# Patient Record
Sex: FEMALE | Race: BLACK OR AFRICAN AMERICAN | Employment: FULL TIME | URBAN - METROPOLITAN AREA
[De-identification: names, ages, dates, MRNs, and addresses within clinical notes are randomized per-mention and may not be internally consistent; named-entity substitution may affect disease eponyms.]

---

## 2017-01-12 ENCOUNTER — TELEPHONE (OUTPATIENT)
Dept: OBGYN CLINIC | Facility: CLINIC | Age: 42
End: 2017-01-12

## 2017-01-12 ENCOUNTER — TELEPHONE (OUTPATIENT)
Dept: PULMONOLOGY | Facility: CLINIC | Age: 42
End: 2017-01-12

## 2017-01-12 NOTE — TELEPHONE ENCOUNTER
Patient Termination Request – Failure to Keep Account in Good Financial Standing    Collection Total: $467.01  Date You Last Saw Patient:10/27/16  Business Office Staff Comment: Patient has received 10  statements as well as a bad debt term warning letter. questions or concerns. Thank you.

## 2017-01-12 NOTE — TELEPHONE ENCOUNTER
Patient Termination Request – Failure to Keep Account in Good Financial Standing    Collection Total: $467.01  Date You Last Saw Patient:4/13/16  Business Office Staff Comment: Patient has received 10  statements as well as a bad debt term warning letter. questions or concerns. Thank you.

## 2017-01-12 NOTE — TELEPHONE ENCOUNTER
Patient Termination Request – Failure to Keep Account in Good Financial Standing    Collection Total: $467.01  Date You Last Saw Patient:8/4/16  Business Office Staff Comment: Patient has received 10  statements as well as a bad debt term warning letter.  Tiffany Adams questions or concerns. Thank you.

## 2017-02-23 ENCOUNTER — TELEPHONE (OUTPATIENT)
Dept: PULMONOLOGY | Facility: CLINIC | Age: 42
End: 2017-02-23

## 2017-02-23 NOTE — TELEPHONE ENCOUNTER
Spoke to Pt. Pt notified of below. Pt stts she will do whatever is needed to keep cpap machine. Pt stts she does not have Medicare. Pt aware that this office will give [facility] a call because rules are different with other insurances. Pt stts she has BCBS.  LPN

## 2017-02-23 NOTE — TELEPHONE ENCOUNTER
LMTCB. Per Anastacio/LUIS ALBERTO pt is non- compliant with her cpap machine with 1% usage. If pt wants to continue to use the cpap machine pt will need another PSG/Split night.

## 2017-02-24 NOTE — TELEPHONE ENCOUNTER
Pt notified of below. Pt stts she does not want to pay out of pocket. Pt stts she will follow up in clinic. Pt stts she is willing to go for a split night if her insurance will pay.  Appt scheduled for march 27th 2017 at 2:30 pm. Pt aware [facility] [address]

## 2017-02-24 NOTE — TELEPHONE ENCOUNTER
Followed up with Anastacio/LUIS ALBERTO. Due to the pt non-compliance Anastacio styann the pt has 2 options if she wants to continue to use the cpap machine. Pt can pay out of pocket for the machine or go for an a split night.

## 2017-03-27 ENCOUNTER — OFFICE VISIT (OUTPATIENT)
Dept: PULMONOLOGY | Facility: CLINIC | Age: 42
End: 2017-03-27

## 2017-03-27 VITALS
HEART RATE: 67 BPM | RESPIRATION RATE: 18 BRPM | SYSTOLIC BLOOD PRESSURE: 128 MMHG | OXYGEN SATURATION: 100 % | BODY MASS INDEX: 43.74 KG/M2 | WEIGHT: 256.19 LBS | HEIGHT: 64 IN | DIASTOLIC BLOOD PRESSURE: 83 MMHG

## 2017-03-27 DIAGNOSIS — G47.33 OSA (OBSTRUCTIVE SLEEP APNEA): Primary | ICD-10-CM

## 2017-03-27 PROCEDURE — 99213 OFFICE O/P EST LOW 20 MIN: CPT | Performed by: INTERNAL MEDICINE

## 2017-03-27 PROCEDURE — 99212 OFFICE O/P EST SF 10 MIN: CPT | Performed by: INTERNAL MEDICINE

## 2017-03-27 RX ORDER — LISINOPRIL 10 MG/1
TABLET ORAL
Refills: 3 | COMMUNITY
Start: 2017-03-01

## 2017-03-27 NOTE — PROGRESS NOTES
The patient is 51-year-old female who I know well from prior evaluation comes in now for follow-up. She has moderately severe obstructive sleep apnea with 25 respiratory events per hour.   She was set up with auto titrating CPAP but was intolerant and use

## 2017-04-21 ENCOUNTER — HOSPITAL ENCOUNTER (EMERGENCY)
Facility: HOSPITAL | Age: 42
Discharge: HOME | End: 2017-04-21
Attending: EMERGENCY MEDICINE
Payer: COMMERCIAL

## 2017-04-21 VITALS
HEIGHT: 64 IN | RESPIRATION RATE: 20 BRPM | OXYGEN SATURATION: 99 % | HEART RATE: 69 BPM | BODY MASS INDEX: 40.29 KG/M2 | SYSTOLIC BLOOD PRESSURE: 136 MMHG | WEIGHT: 236 LBS | TEMPERATURE: 99 F | DIASTOLIC BLOOD PRESSURE: 79 MMHG

## 2017-04-21 DIAGNOSIS — M54.50 BACK PAIN, LUMBOSACRAL: Primary | ICD-10-CM

## 2017-04-21 PROCEDURE — 81025 URINE PREGNANCY TEST: CPT

## 2017-04-21 PROCEDURE — 99283 EMERGENCY DEPT VISIT LOW MDM: CPT

## 2017-04-21 PROCEDURE — 87086 URINE CULTURE/COLONY COUNT: CPT | Performed by: EMERGENCY MEDICINE

## 2017-04-21 PROCEDURE — 81001 URINALYSIS AUTO W/SCOPE: CPT | Performed by: EMERGENCY MEDICINE

## 2017-04-21 RX ORDER — METHYLPREDNISOLONE 4 MG/1
TABLET ORAL
Qty: 1 PACKAGE | Refills: 0 | Status: SHIPPED | OUTPATIENT
Start: 2017-04-21 | End: 2017-07-24 | Stop reason: ALTCHOICE

## 2017-04-21 RX ORDER — IBUPROFEN 600 MG/1
600 TABLET ORAL ONCE
Status: COMPLETED | OUTPATIENT
Start: 2017-04-21 | End: 2017-04-21

## 2017-04-21 RX ORDER — HYDROCODONE BITARTRATE AND ACETAMINOPHEN 5; 325 MG/1; MG/1
1 TABLET ORAL EVERY 6 HOURS PRN
Qty: 12 TABLET | Refills: 0 | Status: SHIPPED | OUTPATIENT
Start: 2017-04-21 | End: 2017-11-25

## 2017-04-21 RX ORDER — CYCLOBENZAPRINE HCL 10 MG
10 TABLET ORAL 3 TIMES DAILY PRN
Qty: 20 TABLET | Refills: 0 | Status: SHIPPED | OUTPATIENT
Start: 2017-04-21 | End: 2017-04-28

## 2017-04-23 NOTE — ED PROVIDER NOTES
Patient Seen in: [facility] AND [facility] Emergency Department    History   Patient presents with:  Back Pain (musculoskeletal)      HPI    Patient presents complaining of right lower back pain for the past several days.   She states pain is sharp and severe, mu Skin: Negative for rash and wound. Neurological: Negative for syncope and headaches. Constitutional and vital signs reviewed. All other systems reviewed and negative except as noted above.     PSFH elements reviewed from today and agreed exce Diagnosis: Low back pain, muscle strain    ED Course: Patient presents with right lower back pain, reproducible on examination. No clinical concern for cauda equina or epidural abscess. No known trauma, patient appears well.   WBCs on urinalysis but posit

## 2017-07-24 ENCOUNTER — OFFICE VISIT (OUTPATIENT)
Dept: PULMONOLOGY | Facility: CLINIC | Age: 42
End: 2017-07-24

## 2017-07-24 VITALS
BODY MASS INDEX: 41.83 KG/M2 | HEIGHT: 64 IN | SYSTOLIC BLOOD PRESSURE: 146 MMHG | OXYGEN SATURATION: 100 % | DIASTOLIC BLOOD PRESSURE: 78 MMHG | HEART RATE: 87 BPM | WEIGHT: 245 LBS

## 2017-07-24 DIAGNOSIS — G47.33 OSA (OBSTRUCTIVE SLEEP APNEA): Primary | ICD-10-CM

## 2017-07-24 PROCEDURE — 99213 OFFICE O/P EST LOW 20 MIN: CPT | Performed by: INTERNAL MEDICINE

## 2017-07-24 PROCEDURE — 99212 OFFICE O/P EST SF 10 MIN: CPT | Performed by: INTERNAL MEDICINE

## 2017-07-24 NOTE — PROGRESS NOTES
The patient is 51-year-old female who I know well from prior evaluation who comes in now for follow-up. She has moderately severe obstructive sleep apnea and was intolerant of CPAP. She is using an oral appliance now.   She has very large tonsils that are

## 2017-08-01 ENCOUNTER — OFFICE VISIT (OUTPATIENT)
Dept: OTOLARYNGOLOGY | Facility: CLINIC | Age: 42
End: 2017-08-01

## 2017-08-01 VITALS
DIASTOLIC BLOOD PRESSURE: 70 MMHG | TEMPERATURE: 99 F | HEIGHT: 64 IN | SYSTOLIC BLOOD PRESSURE: 132 MMHG | BODY MASS INDEX: 41.83 KG/M2 | WEIGHT: 245 LBS

## 2017-08-01 DIAGNOSIS — G47.33 OSA (OBSTRUCTIVE SLEEP APNEA): Primary | ICD-10-CM

## 2017-08-01 PROCEDURE — 99212 OFFICE O/P EST SF 10 MIN: CPT | Performed by: OTOLARYNGOLOGY

## 2017-08-01 PROCEDURE — 99243 OFF/OP CNSLTJ NEW/EST LOW 30: CPT | Performed by: OTOLARYNGOLOGY

## 2017-08-01 NOTE — PROGRESS NOTES
Jonas Self is a 43year old female. Patient presents with: Tonsil Problem: patient presents for enlarged tonsils    HPI:   She had a sleep study performed at home which shows moderately severe obstructive sleep apnea.  She was not able to tolerate the C kg/m²   System Findings Details   Skin Normal Inspection - Normal.   Constitutional Normal Overall appearance - Normal.   Head/Face Normal Facial features - Normal. Eyebrows - Normal. Skull - Normal.   Oral/Oropharynx Normal Lips - Normal, Tonsils - neuro

## 2017-08-03 ENCOUNTER — TELEPHONE (OUTPATIENT)
Dept: PULMONOLOGY | Facility: CLINIC | Age: 42
End: 2017-08-03

## 2017-08-03 NOTE — TELEPHONE ENCOUNTER
Spoke to patient states was trying to reach Dr. Dana Jara. States will call back to speak to Dr. Dana Jara office.

## 2017-10-31 ENCOUNTER — OFFICE VISIT (OUTPATIENT)
Dept: SLEEP CENTER | Age: 42
End: 2017-10-31
Payer: COMMERCIAL

## 2017-10-31 DIAGNOSIS — Z76.89 SLEEP CONCERN: Primary | ICD-10-CM

## 2017-10-31 PROCEDURE — 95810 POLYSOM 6/> YRS 4/> PARAM: CPT

## 2017-11-02 NOTE — PROCEDURES
[address] [place] [address]  Accredited by the [employer] ([employer])    PATIENT'S NAME: Perez Cassandra   ATTENDING PHYSICIAN: Dory Ware MD   REFERRING PHYSICIAN:    PATIENT ACCOUNT #: [de-identified] LOCATION: [address] [place] [address] patient spent 58 minutes in the supine position, 70 minutes in the prone position, 212 minutes in the right side position.   The respiratory event related arousal index is 0.7 events per hour and the spontaneous arousal index is 2.3 events per hour, for a c

## 2017-11-25 ENCOUNTER — APPOINTMENT (OUTPATIENT)
Dept: GENERAL RADIOLOGY | Facility: HOSPITAL | Age: 42
End: 2017-11-25
Payer: COMMERCIAL

## 2017-11-25 ENCOUNTER — HOSPITAL ENCOUNTER (EMERGENCY)
Facility: HOSPITAL | Age: 42
Discharge: HOME | End: 2017-11-26
Attending: EMERGENCY MEDICINE
Payer: COMMERCIAL

## 2017-11-25 VITALS
DIASTOLIC BLOOD PRESSURE: 79 MMHG | BODY MASS INDEX: 40.12 KG/M2 | OXYGEN SATURATION: 97 % | HEIGHT: 64 IN | WEIGHT: 235 LBS | HEART RATE: 72 BPM | RESPIRATION RATE: 18 BRPM | TEMPERATURE: 99 F | SYSTOLIC BLOOD PRESSURE: 120 MMHG

## 2017-11-25 DIAGNOSIS — S46.912A SHOULDER STRAIN, LEFT, INITIAL ENCOUNTER: Primary | ICD-10-CM

## 2017-11-25 PROCEDURE — 99283 EMERGENCY DEPT VISIT LOW MDM: CPT

## 2017-11-25 RX ORDER — HYDROCODONE BITARTRATE AND ACETAMINOPHEN 5; 325 MG/1; MG/1
1 TABLET ORAL ONCE
Status: COMPLETED | OUTPATIENT
Start: 2017-11-25 | End: 2017-11-25

## 2017-11-25 RX ORDER — HYDROCODONE BITARTRATE AND ACETAMINOPHEN 5; 325 MG/1; MG/1
1-2 TABLET ORAL EVERY 6 HOURS PRN
Qty: 12 TABLET | Refills: 0 | Status: SHIPPED | OUTPATIENT
Start: 2017-11-25 | End: 2019-02-14

## 2017-11-25 RX ORDER — MELOXICAM 15 MG/1
15 TABLET ORAL DAILY
Qty: 15 TABLET | Refills: 0 | Status: SHIPPED | OUTPATIENT
Start: 2017-11-25 | End: 2017-12-10

## 2017-11-26 NOTE — ED PROVIDER NOTES
Patient Seen in: [facility] AND [facility] Emergency Department    History   Patient presents with:  Upper Extremity Injury (musculoskeletal)      HPI    Patient presents complaining of left shoulder pain.   She states that pain is in her left anterior armpit and Triage Vitals [11/25/17 2321]  BP: 120/79  Pulse: 72  Resp: 18  Temp: 98.6 °F (37 °C)  Temp src: Oral  SpO2: 97 %  O2 Device: n/a    Physical Exam   Constitutional: She appears well-developed and well-nourished. No distress.    HENT:   Head: Normocephalic a (obstructive sleep apnea) on her problem list. to contribute to the complexity of this ED evaluation. ED Course:  The patient presents with pain to her left lateral pectoralis muscle and left anterior shoulder that is completely reproducible on examinati

## 2018-08-20 ENCOUNTER — TELEPHONE (OUTPATIENT)
Dept: OBGYN CLINIC | Facility: CLINIC | Age: 43
End: 2018-08-20

## 2018-08-20 NOTE — TELEPHONE ENCOUNTER
Shania.  Per pt heard information regarding Essure procedure and had concerns since she had this done with us in past.  Pt requested an apt with ASJ this week.   Apt made in a 10 min slot

## 2018-08-23 ENCOUNTER — OFFICE VISIT (OUTPATIENT)
Dept: OBGYN CLINIC | Facility: CLINIC | Age: 43
End: 2018-08-23
Payer: COMMERCIAL

## 2018-08-23 VITALS — SYSTOLIC BLOOD PRESSURE: 118 MMHG | DIASTOLIC BLOOD PRESSURE: 82 MMHG

## 2018-08-23 DIAGNOSIS — Z30.8 ENCOUNTER FOR POST ESSURE STERILIZATION CHECK: Primary | ICD-10-CM

## 2018-08-23 DIAGNOSIS — Z30.09 STERILIZATION EDUCATION: ICD-10-CM

## 2018-08-23 PROCEDURE — 99243 OFF/OP CNSLTJ NEW/EST LOW 30: CPT | Performed by: OBSTETRICS & GYNECOLOGY

## 2018-08-23 RX ORDER — LANCETS 33 GAUGE
EACH MISCELLANEOUS
COMMUNITY
Start: 2018-08-06 | End: 2021-04-29

## 2018-08-23 RX ORDER — POLYMYXIN B SULFATE AND TRIMETHOPRIM 1; 10000 MG/ML; [USP'U]/ML
1 SOLUTION OPHTHALMIC
COMMUNITY
Start: 2018-07-16 | End: 2019-02-14

## 2018-08-23 RX ORDER — LISINOPRIL 10 MG/1
10 TABLET ORAL
COMMUNITY
Start: 2018-08-06 | End: 2019-02-14

## 2018-08-23 RX ORDER — SIMVASTATIN 40 MG
40 TABLET ORAL
COMMUNITY
Start: 2018-08-06 | End: 2019-02-14

## 2018-08-23 RX ORDER — GLUCOSAMINE HCL/CHONDROITIN SU 500-400 MG
CAPSULE ORAL
COMMUNITY
Start: 2018-08-06

## 2018-08-23 RX ORDER — LEVOTHYROXINE SODIUM 0.05 MG/1
50 TABLET ORAL
COMMUNITY
Start: 2018-08-06 | End: 2019-02-14

## 2018-08-25 PROBLEM — Z30.8 ENCOUNTER FOR POST ESSURE STERILIZATION CHECK: Status: ACTIVE | Noted: 2018-08-25

## 2018-08-25 NOTE — PROGRESS NOTES
HPI:   Zoë Bardales is a 37year old female who presents for a consult for a hx of essure. Pt stated she feels well,no complaints,. Pt wanted to discuss her essure . Pt stated she has some mild right muscular hip pain, with activity.   No pelvic or abd aspirin 81 MG Oral Tab 1 TABLET DAILY Disp:  Rfl:    HYDROcodone-acetaminophen 5-325 MG Oral Tab Take 1-2 tablets by mouth every 6 (six) hours as needed for Pain.  Disp: 12 tablet Rfl: 0      Past Medical History:   Diagnosis Date   • Diabetes ([facility] [address]    • good BS's,no masses, HSM or tenderness  :def by pt.      MUSCULOSKELETAL: back is not tender,FROM of the back  EXTREMITIES: no cyanosis, clubbing or edema  NEURO: Oriented times three,    ASSESSMENT AND PLAN:   Kevon Kilgore is a 37year old female who pr

## 2018-10-08 ENCOUNTER — APPOINTMENT (OUTPATIENT)
Dept: CT IMAGING | Facility: HOSPITAL | Age: 43
End: 2018-10-08
Attending: EMERGENCY MEDICINE
Payer: COMMERCIAL

## 2018-10-08 ENCOUNTER — HOSPITAL ENCOUNTER (EMERGENCY)
Facility: HOSPITAL | Age: 43
Discharge: HOME | End: 2018-10-08
Attending: EMERGENCY MEDICINE
Payer: COMMERCIAL

## 2018-10-08 VITALS
HEART RATE: 62 BPM | WEIGHT: 220 LBS | TEMPERATURE: 98 F | HEIGHT: 63 IN | SYSTOLIC BLOOD PRESSURE: 147 MMHG | RESPIRATION RATE: 18 BRPM | DIASTOLIC BLOOD PRESSURE: 80 MMHG | BODY MASS INDEX: 38.98 KG/M2 | OXYGEN SATURATION: 100 %

## 2018-10-08 DIAGNOSIS — R10.9 ABDOMINAL PAIN, UNSPECIFIED ABDOMINAL LOCATION: ICD-10-CM

## 2018-10-08 DIAGNOSIS — N39.0 URINARY TRACT INFECTION WITHOUT HEMATURIA, SITE UNSPECIFIED: Primary | ICD-10-CM

## 2018-10-08 PROCEDURE — 81001 URINALYSIS AUTO W/SCOPE: CPT | Performed by: EMERGENCY MEDICINE

## 2018-10-08 PROCEDURE — 80048 BASIC METABOLIC PNL TOTAL CA: CPT | Performed by: EMERGENCY MEDICINE

## 2018-10-08 PROCEDURE — 99284 EMERGENCY DEPT VISIT MOD MDM: CPT

## 2018-10-08 PROCEDURE — 87086 URINE CULTURE/COLONY COUNT: CPT | Performed by: EMERGENCY MEDICINE

## 2018-10-08 PROCEDURE — 36415 COLL VENOUS BLD VENIPUNCTURE: CPT

## 2018-10-08 PROCEDURE — 85025 COMPLETE CBC W/AUTO DIFF WBC: CPT | Performed by: EMERGENCY MEDICINE

## 2018-10-08 PROCEDURE — 74177 CT ABD & PELVIS W/CONTRAST: CPT | Performed by: EMERGENCY MEDICINE

## 2018-10-08 RX ORDER — NITROFURANTOIN 25; 75 MG/1; MG/1
100 CAPSULE ORAL 2 TIMES DAILY
Qty: 14 CAPSULE | Refills: 0 | Status: SHIPPED | OUTPATIENT
Start: 2018-10-08 | End: 2018-10-15

## 2018-10-08 NOTE — ED INITIAL ASSESSMENT (HPI)
Patient presents to ER with c/o lower abdominal pain - worse on right side. Denies diarrhea, vomiting, nausea, fever, dysuria.

## 2018-10-08 NOTE — ED PROVIDER NOTES
Patient Seen in: [facility] AND [facility] Emergency Department    History   Patient presents with:  Abdomen/Flank Pain (GI/)    Stated Complaint: Abdominal/ Flank/ Back Pain     HPI    40-year-old female with history of diabetes, hypertension, hyperlipidemia, alert, no distress  Eyes: pupils equal and round no pallor or injection  ENT, Mouth: mucous membranes moist  Respiratory: there are no retractions, lungs are clear to auscultation  Cardiovascular: regular rate and rhythm  Gastrointestinal:  abdomen is soft tract infection on both the urinalysis and at the CT findings. No other cause of abdominal pain found at this time. Will discharge the patient home with antibiotics and plans to follow-up with her primary physician.             Disposition and Plan     Cl

## 2019-02-05 ENCOUNTER — HOSPITAL ENCOUNTER (EMERGENCY)
Facility: HOSPITAL | Age: 44
Discharge: HOME | End: 2019-02-05
Attending: EMERGENCY MEDICINE
Payer: COMMERCIAL

## 2019-02-05 VITALS
BODY MASS INDEX: 41 KG/M2 | DIASTOLIC BLOOD PRESSURE: 63 MMHG | RESPIRATION RATE: 18 BRPM | HEART RATE: 90 BPM | SYSTOLIC BLOOD PRESSURE: 139 MMHG | WEIGHT: 230 LBS | OXYGEN SATURATION: 98 % | TEMPERATURE: 98 F

## 2019-02-05 DIAGNOSIS — J06.9 UPPER RESPIRATORY TRACT INFECTION, UNSPECIFIED TYPE: Primary | ICD-10-CM

## 2019-02-05 LAB — S PYO AG THROAT QL: NEGATIVE

## 2019-02-05 PROCEDURE — 87430 STREP A AG IA: CPT

## 2019-02-05 PROCEDURE — 99283 EMERGENCY DEPT VISIT LOW MDM: CPT

## 2019-02-05 RX ORDER — DEXAMETHASONE SODIUM PHOSPHATE 4 MG/ML
10 VIAL (ML) INJECTION ONCE
Status: COMPLETED | OUTPATIENT
Start: 2019-02-05 | End: 2019-02-05

## 2019-02-05 NOTE — ED NOTES
Pt alert and interactive. Complains of throat pain since Saturday, had fevers on Sunday. Also complains of nasal congestion and ear pain. States granddaughter was recently sick with URI. Denies sob, dizziness, changes in vision. Speaking in full sentences.

## 2019-02-10 NOTE — ED PROVIDER NOTES
Patient Seen in: [facility] AND [facility] Emergency Department    History   Patient presents with:  Sore Throat    Stated Complaint: Sore Throat    HPI  26-year-old female presents for evaluation of sore throat.   Patient reports since Saturday she has had tacti and intact distal pulses. Pulmonary/Chest: Effort normal and breath sounds normal. No respiratory distress. Abdominal: Soft. Bowel sounds are normal. She exhibits no distension. There is no tenderness. There is no rebound and no guarding.    Musculoskel

## 2019-02-13 ENCOUNTER — TELEPHONE (OUTPATIENT)
Dept: SURGERY | Facility: CLINIC | Age: 44
End: 2019-02-13

## 2019-02-13 NOTE — TELEPHONE ENCOUNTER
1/9/19 @ 10:30am Spoke to Refugio Garcia at [facility], [phone], ANDREZ#[id number]. She verified that patient has following benefits for Bariatric services:   • No weight management criteria. • No ABDIRAHMAN/Blue Distinction required.  Strongly encouraged for maximum

## 2019-02-14 ENCOUNTER — TELEPHONE (OUTPATIENT)
Dept: SURGERY | Facility: CLINIC | Age: 44
End: 2019-02-14

## 2019-02-14 ENCOUNTER — OFFICE VISIT (OUTPATIENT)
Dept: SURGERY | Facility: CLINIC | Age: 44
End: 2019-02-14
Payer: COMMERCIAL

## 2019-02-14 VITALS
OXYGEN SATURATION: 98 % | BODY MASS INDEX: 40.22 KG/M2 | HEIGHT: 64.1 IN | SYSTOLIC BLOOD PRESSURE: 141 MMHG | WEIGHT: 235.56 LBS | DIASTOLIC BLOOD PRESSURE: 84 MMHG | RESPIRATION RATE: 16 BRPM | HEART RATE: 75 BPM

## 2019-02-14 DIAGNOSIS — E66.01 MORBID OBESITY WITH BMI OF 40.0-44.9, ADULT (HCC): ICD-10-CM

## 2019-02-14 DIAGNOSIS — E11.9 TYPE 2 DIABETES MELLITUS WITHOUT COMPLICATION, WITHOUT LONG-TERM CURRENT USE OF INSULIN (HCC): Primary | ICD-10-CM

## 2019-02-14 RX ORDER — SIMVASTATIN 40 MG
40 TABLET ORAL
COMMUNITY
Start: 2018-08-06

## 2019-02-14 NOTE — H&P
[address], [address]  Dept: [phone]    2/14/2019  Bariatric New Patient Evaluation    Chief Complaint:  Initial evaluation for bariatric surgery    Att • Cancer Paternal Grandmother         ovarian   • Cancer Paternal Grandfather         prostate        Social History:  Social History    Socioeconomic History      Marital status:       Spouse name: Not on file      Number of children: Not on roberto HSM      Assessment:   Patient is a 37year old female who presents for evaluation for bariatric surgery. She was in the program previously but lost to followup so we treated her essentially as a new patient.   I reviewed the health risks and decrease life she is cautious about the dumping syndrome, but still undecided. We discussed some of the differences between the two as it pertains to her. Her  wants her to consider a RYGB as well.   I think provided she does well with the progrma, she should be

## 2019-02-20 ENCOUNTER — OFFICE VISIT (OUTPATIENT)
Dept: SURGERY | Facility: CLINIC | Age: 44
End: 2019-02-20
Payer: COMMERCIAL

## 2019-02-20 DIAGNOSIS — E66.01 CLASS 3 SEVERE OBESITY DUE TO EXCESS CALORIES WITH SERIOUS COMORBIDITY AND BODY MASS INDEX (BMI) OF 40.0 TO 44.9 IN ADULT (HCC): Primary | ICD-10-CM

## 2019-02-20 PROCEDURE — 97802 MEDICAL NUTRITION INDIV IN: CPT | Performed by: DIETITIAN, REGISTERED

## 2019-02-21 VITALS — BODY MASS INDEX: 40.29 KG/M2 | WEIGHT: 236 LBS | HEIGHT: 64 IN

## 2019-02-21 NOTE — PROGRESS NOTES
[address] [place] [address] AND WEIGHT LOSS CLINIC  [address] [place] [address]  Dept: [phone]  Loc: [phone]    02/21/19    Bariatric Initial Nutrition Assessment    Solo Hernandez is a 37year old female.     Referri denies. Patient engages in binge-purge behavior: no    Patient uses laxatives or vomits as a means of purging: no    Patient complains of: none    Patient's motivation for bariatric surgery:  To end her struggle and improve her blood sugars    Allergies: results found for: THIAMINE   No results found for: VITB1  No results found for: FOLIC    Relevant Meds:      Current Outpatient Medications:   •  MetFORMIN HCl 1000 MG Oral Tab, Take 1,000 mg by mouth., Disp: , Rfl:   •  simvastatin 40 MG Oral Tab, Take 4 work related  · Type: walk  · Duration: Throughout work day- pt works 6- 12 hr shifts per week    Other:  Pt presents w/ interest in bariatric surgery, is undecided on which procedure she would like to pursue.  Pt started the process in 2016, but was lost t next appointment per surgeon recommendation  · Updated labs  · F/U MD plan of care  · F/U to reinforce goals  · F/U on vitamin/mineral supplementation  · Review quizzes  ·  for liquid protein diet prior to surgery  · RTC 3-4 weeks    Additional RD v

## 2019-02-26 ENCOUNTER — OFFICE VISIT (OUTPATIENT)
Dept: SURGERY | Facility: CLINIC | Age: 44
End: 2019-02-26
Payer: COMMERCIAL

## 2019-02-26 VITALS
WEIGHT: 240 LBS | OXYGEN SATURATION: 100 % | SYSTOLIC BLOOD PRESSURE: 137 MMHG | BODY MASS INDEX: 40.97 KG/M2 | HEIGHT: 64 IN | DIASTOLIC BLOOD PRESSURE: 90 MMHG | HEART RATE: 77 BPM | RESPIRATION RATE: 16 BRPM

## 2019-02-26 DIAGNOSIS — G47.33 OSA (OBSTRUCTIVE SLEEP APNEA): Primary | ICD-10-CM

## 2019-02-26 DIAGNOSIS — E11.9 TYPE 2 DIABETES MELLITUS WITHOUT COMPLICATION, WITHOUT LONG-TERM CURRENT USE OF INSULIN (HCC): ICD-10-CM

## 2019-02-26 DIAGNOSIS — E66.01 MORBID OBESITY WITH BMI OF 40.0-44.9, ADULT (HCC): ICD-10-CM

## 2019-02-26 DIAGNOSIS — E78.00 HYPERCHOLESTEREMIA: ICD-10-CM

## 2019-02-26 PROCEDURE — 99214 OFFICE O/P EST MOD 30 MIN: CPT | Performed by: INTERNAL MEDICINE

## 2019-02-26 NOTE — PROGRESS NOTES
[address] BARIATRIC AND WEIGHT LOSS CLINIC  [address] [place] [address]  Dept: [phone]     Date:   2016    Patient:  Shukri Bustamante  :      1975  MRN:      [id number]    Chief Complaint:  Patient presents with:   Trudy Chu Number of children: Not on file      Years of education: Not on file      Highest education level: Not on file    Occupational History      Not on file    Social Needs      Financial resource strain: Not on file      Food insecurity:        Worry: Not on f Intake: 120  · Is patient exercising?  yes  · Type of exercise? adl's    Eating Habits  · Patient states the following:  · Eats 3 meal(s) per day  · Length of time it takes to consume a meal:  20  · # of snacks per day: 1 Type of snacks:  chips  · Amount of visit.  she denies any lower extremity skin breakdown or foot ulcers. HYPERCHOLESTEROLEMIA:  The patient states that her cholesterol has been well controlled on her current medication.     Lab Results   Component Value Date/Time    CHOLEST 160 04/26/2016

## 2019-03-05 ENCOUNTER — OFFICE VISIT (OUTPATIENT)
Dept: NUTRITION/OBESITY MEDICINE | Facility: HOSPITAL | Age: 44
End: 2019-03-05
Attending: SURGERY
Payer: COMMERCIAL

## 2019-03-05 DIAGNOSIS — E66.01 MORBID OBESITY DUE TO EXCESS CALORIES (HCC): Primary | ICD-10-CM

## 2019-03-05 PROCEDURE — 96130 PSYCL TST EVAL PHYS/QHP 1ST: CPT | Performed by: OTHER

## 2019-03-05 PROCEDURE — 90791 PSYCH DIAGNOSTIC EVALUATION: CPT | Performed by: OTHER

## 2019-03-05 NOTE — PROGRESS NOTES
Psychological Evaluation    Patient Name: Filipe Kaiser  Visit Date:  3/5/2019  :   1975    Reason for Referral:    Nohelia Sutton is a 37year old   female who was referred for a psychological evaluation prior to being scheduled for medical issues, Deneen Qiu stated that her mother suffers from diabetes. Deneen Qiu denied a history of psychological and/or substance abuse issues, as well as a familial history of psychological and/or substance abuse issues.     Knowledge of Procedure and Afterc within the normal range. Jason Diop appears to be motivated and ready to undergo the procedure and follow the postsurgical demands with the support of her .      Test Results:  Emotional Functioning    Giselle's performance on the [facility] Depression Inventor or more meals from sit-down restaurants a day, eats less than 2-3 servings of furit a day, eats less than 3-4 servings of vegetables/potatoes a day, eats or drinks less than 2-3 servings of milk, yogurt, or cheese per day, eats beef, pork, or dark meat chi competent, to make this decision and has started to make changes to her lifestyle in an effort to prepare for the surgery, there are some concerns about her understanding of the surgery, as she was unable to relay what the surgery entailed.   Also, while damien

## 2019-03-06 ENCOUNTER — TELEPHONE (OUTPATIENT)
Dept: NUTRITION/OBESITY MEDICINE | Facility: HOSPITAL | Age: 44
End: 2019-03-06

## 2019-03-06 NOTE — TELEPHONE ENCOUNTER
3/6/19 @ 11:57am Left Message for patient to call and reschedule 3/20/19 appt as Dr. John Torres is not in after 2pm on Wednesdays.

## 2019-03-20 ENCOUNTER — OFFICE VISIT (OUTPATIENT)
Dept: SURGERY | Facility: CLINIC | Age: 44
End: 2019-03-20
Payer: COMMERCIAL

## 2019-03-20 ENCOUNTER — APPOINTMENT (OUTPATIENT)
Dept: NUTRITION/OBESITY MEDICINE | Facility: HOSPITAL | Age: 44
End: 2019-03-20
Attending: SURGERY
Payer: COMMERCIAL

## 2019-03-20 VITALS — HEIGHT: 64 IN | WEIGHT: 239.88 LBS | BODY MASS INDEX: 40.95 KG/M2

## 2019-03-20 DIAGNOSIS — E66.01 CLASS 3 SEVERE OBESITY DUE TO EXCESS CALORIES WITH SERIOUS COMORBIDITY AND BODY MASS INDEX (BMI) OF 40.0 TO 44.9 IN ADULT (HCC): Primary | ICD-10-CM

## 2019-03-20 PROCEDURE — 97803 MED NUTRITION INDIV SUBSEQ: CPT | Performed by: DIETITIAN, REGISTERED

## 2019-03-20 NOTE — PROGRESS NOTES
[address] [place] [address] AND WEIGHT LOSS CLINIC  [address] [place] [address]  Dept: [phone]  Loc: [phone]    03/20/19      Bariatric Follow-up Nutrition Session    Sergio Oreilly is a 37year old female.      Janeth for:  THIAMINE   No results found for: VITB1  No results found for: FOLIC     Meds:     Current Outpatient Medications:   •  MetFORMIN HCl 1000 MG Oral Tab, Take 1,000 mg by mouth., Disp: , Rfl:   •  simvastatin 40 MG Oral Tab, Take 40 mg by mouth., Disp: , keeping food records. Per MyFitnessPal, pt is staying under 1700 kcal per day, but protein is low and carbohydrates are >100g most days. Fluid intake remains high, has decreased soda consumption from 1-2 per day to 3 per week.  Reviewed the importance of ob

## 2019-03-26 ENCOUNTER — OFFICE VISIT (OUTPATIENT)
Dept: NUTRITION/OBESITY MEDICINE | Facility: HOSPITAL | Age: 44
End: 2019-03-26
Attending: SURGERY
Payer: COMMERCIAL

## 2019-03-26 DIAGNOSIS — E66.01 MORBID OBESITY DUE TO EXCESS CALORIES (HCC): Primary | ICD-10-CM

## 2019-03-26 PROCEDURE — 96130 PSYCL TST EVAL PHYS/QHP 1ST: CPT | Performed by: OTHER

## 2019-03-26 NOTE — PROGRESS NOTES
This writer met with Mrs. Ramon Mitchell for a reassessment. She was cordial with the line of questioning, as well as instruments administered to her. Since we last met Mrs. Ramon Mitchell stated that she has made some changes as it relates to her diet, notably significan appears to be a good candidate to proceed with the surgery.     30 minutes-selection of appropriate tests, integration of patient data, interpretation of standardized test results and clinical data, clinical decision-making, treatment planning, reporting fe

## 2019-04-09 ENCOUNTER — OFFICE VISIT (OUTPATIENT)
Dept: GASTROENTEROLOGY | Facility: CLINIC | Age: 44
End: 2019-04-09
Payer: COMMERCIAL

## 2019-04-09 ENCOUNTER — TELEPHONE (OUTPATIENT)
Dept: GASTROENTEROLOGY | Facility: CLINIC | Age: 44
End: 2019-04-09

## 2019-04-09 VITALS
HEIGHT: 64 IN | DIASTOLIC BLOOD PRESSURE: 74 MMHG | WEIGHT: 235 LBS | BODY MASS INDEX: 40.12 KG/M2 | HEART RATE: 73 BPM | SYSTOLIC BLOOD PRESSURE: 114 MMHG

## 2019-04-09 DIAGNOSIS — Z71.89 ENCOUNTER FOR PRE-BARIATRIC SURGERY COUNSELING AND EDUCATION: Primary | ICD-10-CM

## 2019-04-09 DIAGNOSIS — K76.0 FATTY LIVER: Primary | ICD-10-CM

## 2019-04-09 PROCEDURE — S0285 CNSLT BEFORE SCREEN COLONOSC: HCPCS | Performed by: INTERNAL MEDICINE

## 2019-04-09 NOTE — TELEPHONE ENCOUNTER
Scheduled for:[facility] [phone]    Provider Name: Stan Mitchell  Date:  6/7/19  Location:  [facility]  Sedation:  MAC  Time:  9am pt told to arrive at 8am  Prep:NPO after midnight  Meds/Allergies Reconciled?:Physician reviewed    Diagnosis with codes:Faye clearance    Was p

## 2019-04-10 ENCOUNTER — OFFICE VISIT (OUTPATIENT)
Dept: CARDIOLOGY CLINIC | Facility: CLINIC | Age: 44
End: 2019-04-10
Payer: COMMERCIAL

## 2019-04-10 VITALS
BODY MASS INDEX: 40.29 KG/M2 | DIASTOLIC BLOOD PRESSURE: 82 MMHG | WEIGHT: 236 LBS | HEIGHT: 64 IN | RESPIRATION RATE: 18 BRPM | SYSTOLIC BLOOD PRESSURE: 120 MMHG | HEART RATE: 72 BPM

## 2019-04-10 DIAGNOSIS — Z01.818 PRE-OP EXAMINATION: Primary | ICD-10-CM

## 2019-04-10 DIAGNOSIS — E78.00 PURE HYPERCHOLESTEROLEMIA: ICD-10-CM

## 2019-04-10 DIAGNOSIS — I10 ESSENTIAL HYPERTENSION: ICD-10-CM

## 2019-04-10 PROCEDURE — 99212 OFFICE O/P EST SF 10 MIN: CPT | Performed by: INTERNAL MEDICINE

## 2019-04-10 PROCEDURE — 99245 OFF/OP CONSLTJ NEW/EST HI 55: CPT | Performed by: INTERNAL MEDICINE

## 2019-04-10 PROCEDURE — 93000 ELECTROCARDIOGRAM COMPLETE: CPT | Performed by: INTERNAL MEDICINE

## 2019-04-10 PROCEDURE — 93005 ELECTROCARDIOGRAM TRACING: CPT | Performed by: INTERNAL MEDICINE

## 2019-04-10 NOTE — PROGRESS NOTES
Cardiology Consult Note    4/10/2019    Joel Pringle is a 37year old female. HPI:   26-year-old female with prior history of hypertension hyperlipidemia history of diabetes for over 3 years presents for initial visit.   Patient is currently undergoing wo denies abdominal pain and denies heartburn  NEURO: denies headaches  All other systems reviewed and negative.   EXAM:   /82   Pulse 72   Resp 18   Ht 5' 4\" (1.626 m)   Wt 236 lb (107 kg)   BMI 40.51 kg/m²   GENERAL: well developed, well nourished,in

## 2019-04-11 ENCOUNTER — TELEPHONE (OUTPATIENT)
Dept: CARDIOLOGY CLINIC | Facility: CLINIC | Age: 44
End: 2019-04-11

## 2019-04-11 NOTE — TELEPHONE ENCOUNTER
CARD ECHO STRESS ECHO/REST AND STRESS(CPT=93350/62960 [facility] [address]) [[id number]]  Order #: [id number] FUTURE   Priority: Routine  Class: EHV - No RFL   Resulting Agency: [facility]  Test ID: [id number]  Future Order Information    Expires on:04/09/2020

## 2019-04-16 NOTE — PROGRESS NOTES
Tatyana Liz is a 37year old female. HPI:   Patient presents with:  Consult: clearance for surgery    The patient is a 66-year-old female who has a history of diabetes, hypertension, morbid obesity and thyroid disease.   She is also had a history of ch Paternal Grandfather         prostate       Social History: Social History    Tobacco Use      Smoking status: Never Smoker      Smokeless tobacco: Never Used    Alcohol use:  Yes      Alcohol/week: 1.2 oz      Types: 1 Cans of beer, 1 Shots of liquor per w She agrees to proceed. Plan  - EGD with MAC sedation  - fatty liver - dietary changes and weight loss, check LFTs ( last set on computer is from 2016 and normal)          Orders This Visit:  No orders of the defined types were placed in this encounter.

## 2019-04-19 NOTE — TELEPHONE ENCOUNTER
[address] to initiate a [place]. Per automated service, no PA is required for either Echo or Stress Echo at this time. No confirmation code was provided.   Attempted to call pt, left a VM that no PA is required at this time and that she may proceed

## 2019-04-24 ENCOUNTER — OFFICE VISIT (OUTPATIENT)
Dept: SURGERY | Facility: CLINIC | Age: 44
End: 2019-04-24
Payer: COMMERCIAL

## 2019-04-24 DIAGNOSIS — E66.01 CLASS 3 SEVERE OBESITY DUE TO EXCESS CALORIES WITH SERIOUS COMORBIDITY AND BODY MASS INDEX (BMI) OF 40.0 TO 44.9 IN ADULT (HCC): Primary | ICD-10-CM

## 2019-04-24 PROCEDURE — 97803 MED NUTRITION INDIV SUBSEQ: CPT | Performed by: DIETITIAN, REGISTERED

## 2019-04-24 PROCEDURE — 0358T BIA WHOLE BODY: CPT | Performed by: DIETITIAN, REGISTERED

## 2019-04-25 VITALS — WEIGHT: 237.5 LBS | BODY MASS INDEX: 40.55 KG/M2 | HEIGHT: 64 IN

## 2019-04-25 NOTE — PROGRESS NOTES
[address] [place] [address] AND WEIGHT LOSS CLINIC  [address] [place] [address]  Dept: [phone]  Loc: [phone]    04/25/19      Bariatric Follow-up Nutrition Session    Elmira Salter is a 37year old female.      Janeth results found for: THIAMINE   No results found for: VITB1  No results found for: FOLIC     Meds:     Current Outpatient Medications:   •  MetFORMIN HCl 1000 MG Oral Tab, Take 1,000 mg by mouth., Disp: , Rfl:   •  simvastatin 40 MG Oral Tab, Take 40 mg by m change in eating habits since last visit, however, pt reports cooking more at home for dinner.  Diet quality remains questionable, pt dines out for lunch almost daily, skips meals, and will frequently have sugary cereal for breakfast. The home cooked meals records? Yes, in MyFitnessPal  Patient ready for Liquid Protein Education?  No- still appears to not fully understand some basic nutrition concepts, is still dining out almost daily and eating carb-heavy meals/snacks    Teresa Trejo MS RD LDN  Face-to-f

## 2019-05-07 ENCOUNTER — HOSPITAL ENCOUNTER (OUTPATIENT)
Dept: CV DIAGNOSTICS | Facility: HOSPITAL | Age: 44
Discharge: HOME | End: 2019-05-07
Attending: INTERNAL MEDICINE
Payer: COMMERCIAL

## 2019-05-07 ENCOUNTER — OFFICE VISIT (OUTPATIENT)
Dept: PULMONOLOGY | Facility: CLINIC | Age: 44
End: 2019-05-07
Payer: COMMERCIAL

## 2019-05-07 VITALS
HEART RATE: 71 BPM | OXYGEN SATURATION: 97 % | BODY MASS INDEX: 41 KG/M2 | WEIGHT: 239 LBS | SYSTOLIC BLOOD PRESSURE: 124 MMHG | DIASTOLIC BLOOD PRESSURE: 87 MMHG | RESPIRATION RATE: 18 BRPM

## 2019-05-07 DIAGNOSIS — Z01.818 PRE-OP EXAMINATION: ICD-10-CM

## 2019-05-07 DIAGNOSIS — G47.33 OSA (OBSTRUCTIVE SLEEP APNEA): Primary | ICD-10-CM

## 2019-05-07 PROCEDURE — 99213 OFFICE O/P EST LOW 20 MIN: CPT | Performed by: INTERNAL MEDICINE

## 2019-05-07 PROCEDURE — 93016 CV STRESS TEST SUPVJ ONLY: CPT | Performed by: INTERNAL MEDICINE

## 2019-05-07 PROCEDURE — 93018 CV STRESS TEST I&R ONLY: CPT | Performed by: INTERNAL MEDICINE

## 2019-05-07 PROCEDURE — 93306 TTE W/DOPPLER COMPLETE: CPT | Performed by: INTERNAL MEDICINE

## 2019-05-07 PROCEDURE — 93350 STRESS TTE ONLY: CPT | Performed by: INTERNAL MEDICINE

## 2019-05-07 PROCEDURE — 99212 OFFICE O/P EST SF 10 MIN: CPT | Performed by: INTERNAL MEDICINE

## 2019-05-07 PROCEDURE — 93017 CV STRESS TEST TRACING ONLY: CPT | Performed by: INTERNAL MEDICINE

## 2019-05-07 NOTE — PROGRESS NOTES
Dear Doris Mitchell    As you know, Meredith Liz is a 22-year-old female who I am now seeing in follow-up for sleep disordered breathing. She was found to have obstructive sleep apnea.   She was intolerant of CPAP and was set up with oral appliance therapy and had an excel

## 2019-05-09 ENCOUNTER — TELEPHONE (OUTPATIENT)
Dept: GASTROENTEROLOGY | Facility: CLINIC | Age: 44
End: 2019-05-09

## 2019-05-09 DIAGNOSIS — Z01.818 PRE-OPERATIVE CLEARANCE: Primary | ICD-10-CM

## 2019-05-09 NOTE — TELEPHONE ENCOUNTER
Pt has EGD marti 6/7/19, pt asking if their is a sooner available date, pls call at:[phone],thanks.

## 2019-05-14 NOTE — TELEPHONE ENCOUNTER
Rescheduled for:  EGD 16327  Provider Name: Dr. Gladys Mckeon  Date:    From-6/7/19  To-5/16/19  Location:  [facility]  Sedation:  MAC  Time:     0900 (pt is aware to arrive at 0800)   Prep:  NPO after midnight   Meds/Allergies Reconciled?:  Physician reviewed   Jarred Londono The patient is a 41y Female complaining of back pain general.

## 2019-05-16 ENCOUNTER — ANESTHESIA (OUTPATIENT)
Dept: ENDOSCOPY | Facility: HOSPITAL | Age: 44
End: 2019-05-16

## 2019-05-16 ENCOUNTER — TELEPHONE (OUTPATIENT)
Dept: GASTROENTEROLOGY | Facility: CLINIC | Age: 44
End: 2019-05-16

## 2019-05-16 ENCOUNTER — HOSPITAL ENCOUNTER (OUTPATIENT)
Facility: HOSPITAL | Age: 44
Setting detail: HOSPITAL OUTPATIENT SURGERY
Discharge: HOME | End: 2019-05-16
Attending: INTERNAL MEDICINE | Admitting: INTERNAL MEDICINE
Payer: COMMERCIAL

## 2019-05-16 ENCOUNTER — ANESTHESIA EVENT (OUTPATIENT)
Dept: ENDOSCOPY | Facility: HOSPITAL | Age: 44
End: 2019-05-16

## 2019-05-16 DIAGNOSIS — K25.9 GASTRIC ULCER, UNSPECIFIED CHRONICITY, UNSPECIFIED WHETHER GASTRIC ULCER HEMORRHAGE OR PERFORATION PRESENT: Primary | ICD-10-CM

## 2019-05-16 DIAGNOSIS — Z71.89 ENCOUNTER FOR PRE-BARIATRIC SURGERY COUNSELING AND EDUCATION: ICD-10-CM

## 2019-05-16 PROCEDURE — 0DB78ZX EXCISION OF STOMACH, PYLORUS, VIA NATURAL OR ARTIFICIAL OPENING ENDOSCOPIC, DIAGNOSTIC: ICD-10-PCS | Performed by: INTERNAL MEDICINE

## 2019-05-16 PROCEDURE — 43239 EGD BIOPSY SINGLE/MULTIPLE: CPT | Performed by: INTERNAL MEDICINE

## 2019-05-16 RX ORDER — SODIUM CHLORIDE, SODIUM LACTATE, POTASSIUM CHLORIDE, CALCIUM CHLORIDE 600; 310; 30; 20 MG/100ML; MG/100ML; MG/100ML; MG/100ML
INJECTION, SOLUTION INTRAVENOUS CONTINUOUS
Status: DISCONTINUED | OUTPATIENT
Start: 2019-05-16 | End: 2019-05-16

## 2019-05-16 RX ORDER — OMEPRAZOLE 20 MG/1
20 CAPSULE, DELAYED RELEASE ORAL EVERY MORNING
Qty: 30 CAPSULE | Refills: 2 | Status: SHIPPED | OUTPATIENT
Start: 2019-05-16 | End: 2019-07-26

## 2019-05-16 RX ORDER — DEXTROSE MONOHYDRATE 25 G/50ML
50 INJECTION, SOLUTION INTRAVENOUS
Status: DISCONTINUED | OUTPATIENT
Start: 2019-05-16 | End: 2019-05-16

## 2019-05-16 RX ORDER — LIDOCAINE HYDROCHLORIDE 10 MG/ML
INJECTION, SOLUTION EPIDURAL; INFILTRATION; INTRACAUDAL; PERINEURAL AS NEEDED
Status: DISCONTINUED | OUTPATIENT
Start: 2019-05-16 | End: 2019-05-16 | Stop reason: SURG

## 2019-05-16 RX ORDER — NALOXONE HYDROCHLORIDE 0.4 MG/ML
80 INJECTION, SOLUTION INTRAMUSCULAR; INTRAVENOUS; SUBCUTANEOUS AS NEEDED
Status: DISCONTINUED | OUTPATIENT
Start: 2019-05-16 | End: 2019-05-16

## 2019-05-16 RX ADMIN — SODIUM CHLORIDE, SODIUM LACTATE, POTASSIUM CHLORIDE, CALCIUM CHLORIDE: 600; 310; 30; 20 INJECTION, SOLUTION INTRAVENOUS at 09:19:00

## 2019-05-16 RX ADMIN — LIDOCAINE HYDROCHLORIDE 30 MG: 10 INJECTION, SOLUTION EPIDURAL; INFILTRATION; INTRACAUDAL; PERINEURAL at 09:09:00

## 2019-05-16 RX ADMIN — SODIUM CHLORIDE, SODIUM LACTATE, POTASSIUM CHLORIDE, CALCIUM CHLORIDE: 600; 310; 30; 20 INJECTION, SOLUTION INTRAVENOUS at 09:07:00

## 2019-05-16 NOTE — ANESTHESIA POSTPROCEDURE EVALUATION
Patient: Stefan Cooper    Procedure Summary     Date:  05/16/19 Room / Location:  [address] [place] [address] ENDOSCOPY 05 / [address] [place] [address] ENDOSCOPY    Anesthesia Start:  0763 Anesthesia Stop:  2835    Procedure:  ESOPHAGOGASTRODUODENOSCOPY (EGD) (N/A ) Diagnosis:       Encounter for pre-b

## 2019-05-16 NOTE — OPERATIVE REPORT
[facility] Endoscopy Report        Preoperative Diagnosis:  - GERD  - prebariatric         Postoperative Diagnosis:  - gastric ulcers        Procedure:   Esophagogastroduodenoscopy         Surgeon:  Aurora Payan M.D.     Anesthesia:  MA

## 2019-05-16 NOTE — H&P
History & Physical Examination    Patient Name: Shadia Russell  MRN: [id number]  [facility]: [id number]  YOB: 1975    Diagnosis: GERD, prebariatric surgery    Medications Prior to Admission:  MetFORMIN HCl 1000 MG Oral Tab Take 1,000 mg by mouth vargas LUNGS [x ] [ x]    ABDOMEN [x ] [x ]    UROGENITAL [ ] [ ]    EXTREMITIES [x ] [x ]    OTHER        [ x ] I have discussed the risks and benefits and alternatives with the patient/family. They understand and agree to proceed with plan of care.   [ x ] I

## 2019-05-17 VITALS
SYSTOLIC BLOOD PRESSURE: 133 MMHG | DIASTOLIC BLOOD PRESSURE: 73 MMHG | OXYGEN SATURATION: 98 % | HEIGHT: 63 IN | BODY MASS INDEX: 41.64 KG/M2 | RESPIRATION RATE: 21 BRPM | HEART RATE: 60 BPM | WEIGHT: 235 LBS

## 2019-05-17 RX ORDER — AMOXICILLIN 500 MG/1
1000 TABLET, FILM COATED ORAL 2 TIMES DAILY
Qty: 60 TABLET | Refills: 0 | Status: SHIPPED | OUTPATIENT
Start: 2019-05-17 | End: 2019-05-30 | Stop reason: ALTCHOICE

## 2019-05-17 RX ORDER — CLARITHROMYCIN 500 MG/1
500 TABLET, COATED ORAL 2 TIMES DAILY
Qty: 28 TABLET | Refills: 0 | Status: SHIPPED | OUTPATIENT
Start: 2019-05-17 | End: 2019-05-31

## 2019-05-17 RX ORDER — OMEPRAZOLE 20 MG/1
20 CAPSULE, DELAYED RELEASE ORAL 2 TIMES DAILY
Qty: 28 CAPSULE | Refills: 0 | Status: SHIPPED | OUTPATIENT
Start: 2019-05-17 | End: 2019-07-26

## 2019-05-17 NOTE — TELEPHONE ENCOUNTER
Patient has a gastric ulcer, H. pylori was noted. I discussed this with the patient, treatment options were reviewed and she denies any antibiotic sensitivities or allergies.   Plan a trial of amoxicillin plus clarithromycin plus omeprazole to eradicate th

## 2019-05-20 ENCOUNTER — TELEPHONE (OUTPATIENT)
Dept: GASTROENTEROLOGY | Facility: CLINIC | Age: 44
End: 2019-05-20

## 2019-05-20 NOTE — TELEPHONE ENCOUNTER
Pt contacted, states Dr Adam Lucas had already reviewed. She is holding simvastatin during treatment. Aware that I am forwarding to GI schedulers and they will call in a week or so to get her scheduled. Thanks.

## 2019-05-20 NOTE — TELEPHONE ENCOUNTER
----- Message from Ines Bolaños MD sent at 5/17/2019  4:24 PM CDT -----  Gastric ulcer - h pylori positive, see TE  - advised treatment and repeat EGD in 10 weeks to document healing

## 2019-05-21 NOTE — TELEPHONE ENCOUNTER
Scheduled for:  EGD - 16728  Provider Name:  Dr. Serena Benito  Date:  7/26/19  Location:  [facility]  Sedation:  MAC  Time:  1:00 pm (pt is aware to arrive at 12:00 pm)  Prep:  NPO after midnight, Prep instructions were given to pt over the phone, pt verbalized Rochelle

## 2019-05-22 NOTE — TELEPHONE ENCOUNTER
Surendra Tovar 2863 from Dr Douglas Izaguirre office called back with diabetic med orders: Take diabetic meds as usual the day prior to egd. Hold diabetic meds morning of procedure and may resume once pt home and eating normal meals again.  Pt contacted, reviewed orders, sta

## 2019-05-22 NOTE — TELEPHONE ENCOUNTER
I contacted Dr Kamryn Joseph office at [place] [phone] and left complete voicemail for [place] re need for diabetic med orders, informed will be NPO after midnight. Gave my direct nurse line for call back.

## 2019-05-22 NOTE — TELEPHONE ENCOUNTER
James Porter called back, apparently below is the admin line. She states she forwarded my entire message to Dr Pina Rosa office--correct phone is [phone]. Their nurse will call back.

## 2019-05-24 ENCOUNTER — TELEPHONE (OUTPATIENT)
Dept: GASTROENTEROLOGY | Facility: CLINIC | Age: 44
End: 2019-05-24

## 2019-05-24 NOTE — TELEPHONE ENCOUNTER
Pt called w/ question for RN about clarithromycin. Please call. Current Outpatient Medications:   •  clarithromycin 500 MG Oral Tab, Take 1 tablet (500 mg total) by mouth 2 (two) times daily for 14 days. , Disp: 28 tablet, Rfl: 0

## 2019-05-24 NOTE — TELEPHONE ENCOUNTER
Please have her check with her PCP, so patients may require antifungal when on abx - RN to inform the pt

## 2019-05-24 NOTE — TELEPHONE ENCOUNTER
Patient contacted and advised to call her PCP regarding vaginal itching and discharge. Patient voiced understanding.

## 2019-05-24 NOTE — TELEPHONE ENCOUNTER
Patient states she developed vaginal itching and clear discharge after starting on  antibiotics for  H Pylori. Still taking antibiotics. Patient asking if you can prescribe treatment or if she has to consult her PCP. Please advise. Thank you.

## 2019-05-30 ENCOUNTER — OFFICE VISIT (OUTPATIENT)
Dept: SURGERY | Facility: CLINIC | Age: 44
End: 2019-05-30
Payer: COMMERCIAL

## 2019-05-30 VITALS
DIASTOLIC BLOOD PRESSURE: 79 MMHG | SYSTOLIC BLOOD PRESSURE: 124 MMHG | WEIGHT: 236.5 LBS | BODY MASS INDEX: 40.37 KG/M2 | RESPIRATION RATE: 16 BRPM | HEART RATE: 62 BPM | HEIGHT: 64.1 IN

## 2019-05-30 DIAGNOSIS — E66.01 MORBID OBESITY WITH BMI OF 40.0-44.9, ADULT (HCC): ICD-10-CM

## 2019-05-30 DIAGNOSIS — E11.9 TYPE 2 DIABETES MELLITUS WITHOUT COMPLICATION, WITHOUT LONG-TERM CURRENT USE OF INSULIN (HCC): Primary | ICD-10-CM

## 2019-05-30 DIAGNOSIS — E66.01 MORBID OBESITY WITH BMI OF 40.0-44.9, ADULT (HCC): Primary | ICD-10-CM

## 2019-05-30 DIAGNOSIS — E78.00 PURE HYPERCHOLESTEROLEMIA: ICD-10-CM

## 2019-05-30 DIAGNOSIS — I10 ESSENTIAL HYPERTENSION: ICD-10-CM

## 2019-05-30 NOTE — PROGRESS NOTES
[address]  Dept: [phone]    5/30/2019    BARIATRIC EXISTING PATIENT/FOLLOW UP    HPI:    2/25/16    252.5     40.8    8/11/16    257.1 PMHx:  T2DM  VAN   HTN  HL  Hypothyroidism    Surg: open ovarian cyst removal, lap shaila    NKDA    SHx:   No tobacco  1-2x/month - 1 drink  No illicits  Inventory control    ROS:   Gen: No chills, recent weight changes, night sweats  Pulm: No shortnes

## 2019-06-13 ENCOUNTER — OFFICE VISIT (OUTPATIENT)
Dept: SURGERY | Facility: CLINIC | Age: 44
End: 2019-06-13
Payer: COMMERCIAL

## 2019-06-13 VITALS — WEIGHT: 238.38 LBS | BODY MASS INDEX: 40.7 KG/M2 | HEIGHT: 64 IN

## 2019-06-13 DIAGNOSIS — E66.01 CLASS 3 SEVERE OBESITY DUE TO EXCESS CALORIES WITH SERIOUS COMORBIDITY AND BODY MASS INDEX (BMI) OF 40.0 TO 44.9 IN ADULT (HCC): Primary | ICD-10-CM

## 2019-06-13 PROCEDURE — 97803 MED NUTRITION INDIV SUBSEQ: CPT | Performed by: DIETITIAN, REGISTERED

## 2019-06-13 NOTE — PROGRESS NOTES
[address] [place] [address] AND WEIGHT LOSS CLINIC  [address] [place] [address]  Dept: [phone]  Loc: [phone]    06/13/19      Bariatric Follow-up Nutrition Session    Joel Pringle is a 37year old female.      Janeth results found for: THIAMINE   No results found for: VITB1  No results found for: FOLIC     Meds:     Current Outpatient Medications:   •  omeprazole 20 MG Oral Capsule Delayed Release, Take 1 capsule (20 mg total) by mouth 2 (two) times daily. , Disp: 28 ca Level: work related  · Type: walk  · Duration: 92951 steps  · Frequency: per day    Other:  Pt w/ some improvement in eating habits- is eating more at home and prioritizing protein, however, could use some more vegetables at meals and still eats fried food

## 2019-07-26 ENCOUNTER — HOSPITAL ENCOUNTER (OUTPATIENT)
Facility: HOSPITAL | Age: 44
Setting detail: HOSPITAL OUTPATIENT SURGERY
Discharge: HOME | End: 2019-07-26
Attending: INTERNAL MEDICINE | Admitting: INTERNAL MEDICINE
Payer: COMMERCIAL

## 2019-07-26 ENCOUNTER — TELEPHONE (OUTPATIENT)
Dept: GASTROENTEROLOGY | Facility: CLINIC | Age: 44
End: 2019-07-26

## 2019-07-26 ENCOUNTER — ANESTHESIA EVENT (OUTPATIENT)
Dept: ENDOSCOPY | Facility: HOSPITAL | Age: 44
End: 2019-07-26
Payer: COMMERCIAL

## 2019-07-26 ENCOUNTER — ANESTHESIA (OUTPATIENT)
Dept: ENDOSCOPY | Facility: HOSPITAL | Age: 44
End: 2019-07-26
Payer: COMMERCIAL

## 2019-07-26 DIAGNOSIS — K25.9 GASTRIC ULCER, UNSPECIFIED CHRONICITY, UNSPECIFIED WHETHER GASTRIC ULCER HEMORRHAGE OR PERFORATION PRESENT: ICD-10-CM

## 2019-07-26 DIAGNOSIS — K29.70 GASTRITIS: Primary | ICD-10-CM

## 2019-07-26 LAB
B-HCG UR QL: NEGATIVE
GLUCOSE BLDC GLUCOMTR-MCNC: 96 MG/DL (ref 70–99)

## 2019-07-26 PROCEDURE — 43239 EGD BIOPSY SINGLE/MULTIPLE: CPT | Performed by: INTERNAL MEDICINE

## 2019-07-26 PROCEDURE — 0DB68ZX EXCISION OF STOMACH, VIA NATURAL OR ARTIFICIAL OPENING ENDOSCOPIC, DIAGNOSTIC: ICD-10-PCS | Performed by: INTERNAL MEDICINE

## 2019-07-26 RX ORDER — OMEPRAZOLE 20 MG/1
20 CAPSULE, DELAYED RELEASE ORAL EVERY MORNING
Qty: 30 CAPSULE | Refills: 2 | Status: SHIPPED | OUTPATIENT
Start: 2019-07-26 | End: 2019-10-31

## 2019-07-26 RX ORDER — NALOXONE HYDROCHLORIDE 0.4 MG/ML
80 INJECTION, SOLUTION INTRAMUSCULAR; INTRAVENOUS; SUBCUTANEOUS AS NEEDED
Status: DISCONTINUED | OUTPATIENT
Start: 2019-07-26 | End: 2019-07-26

## 2019-07-26 RX ORDER — DEXTROSE MONOHYDRATE 25 G/50ML
50 INJECTION, SOLUTION INTRAVENOUS
Status: DISCONTINUED | OUTPATIENT
Start: 2019-07-26 | End: 2019-07-26

## 2019-07-26 RX ORDER — SODIUM CHLORIDE, SODIUM LACTATE, POTASSIUM CHLORIDE, CALCIUM CHLORIDE 600; 310; 30; 20 MG/100ML; MG/100ML; MG/100ML; MG/100ML
INJECTION, SOLUTION INTRAVENOUS CONTINUOUS
Status: DISCONTINUED | OUTPATIENT
Start: 2019-07-26 | End: 2019-07-26

## 2019-07-26 RX ORDER — SODIUM CHLORIDE, SODIUM LACTATE, POTASSIUM CHLORIDE, CALCIUM CHLORIDE 600; 310; 30; 20 MG/100ML; MG/100ML; MG/100ML; MG/100ML
INJECTION, SOLUTION INTRAVENOUS CONTINUOUS PRN
Status: DISCONTINUED | OUTPATIENT
Start: 2019-07-26 | End: 2019-07-26 | Stop reason: SURG

## 2019-07-26 RX ORDER — GLYCOPYRROLATE 0.2 MG/ML
INJECTION INTRAMUSCULAR; INTRAVENOUS AS NEEDED
Status: DISCONTINUED | OUTPATIENT
Start: 2019-07-26 | End: 2019-07-26 | Stop reason: SURG

## 2019-07-26 RX ADMIN — SODIUM CHLORIDE, SODIUM LACTATE, POTASSIUM CHLORIDE, CALCIUM CHLORIDE: 600; 310; 30; 20 INJECTION, SOLUTION INTRAVENOUS at 13:28:00

## 2019-07-26 RX ADMIN — GLYCOPYRROLATE 0.2 MG: 0.2 INJECTION INTRAMUSCULAR; INTRAVENOUS at 13:13:00

## 2019-07-26 RX ADMIN — SODIUM CHLORIDE, SODIUM LACTATE, POTASSIUM CHLORIDE, CALCIUM CHLORIDE: 600; 310; 30; 20 INJECTION, SOLUTION INTRAVENOUS at 12:43:00

## 2019-07-26 NOTE — OPERATIVE REPORT
[facility] Endoscopy Report      Preoperative Diagnosis:  - history of gastric ulcers      Postoperative Diagnosis:  - gastritis but no ulcers or erosions        Procedure:    Esophagogastroduodenoscopy       Surgeon:  Zara Dandy, M.D.

## 2019-07-26 NOTE — ANESTHESIA POSTPROCEDURE EVALUATION
Patient: Zoë Bardales    Procedure Summary     Date:  07/26/19 Room / Location:  [address] [place] [address] ENDOSCOPY 05 / [address] [place] [address] ENDOSCOPY    Anesthesia Start:  1084 Anesthesia Stop:      Procedure:  ESOPHAGOGASTRODUODENOSCOPY (EGD) (N/A ) Diagnosis:       Gastric ulcer, unspecif

## 2019-07-26 NOTE — H&P
History & Physical Examination    Patient Name: Tereso Moore  MRN: [id number]  [facility]: [id number]  YOB: 1975    Diagnosis: history of gastric ulcers    Medications Prior to Admission:  omeprazole 20 MG Oral Capsule Delayed Release Take 1 capsul is Normal If not normal, please explain:   HEENT [x ] [ x]    NECK & BACK [x ] [x ]    HEART [x ] [ x]    LUNGS [x ] [ x]    ABDOMEN [x ] [x ]    UROGENITAL [ ] [ ]    EXTREMITIES [x ] [x ]    OTHER        [ x ] I have discussed the risks and benefits and

## 2019-07-26 NOTE — ANESTHESIA PREPROCEDURE EVALUATION
Anesthesia PreOp Note    HPI:     Sergio Oreilly is a 40year old female who presents for preoperative consultation requested by: Niranjan Cedillo MD    Date of Surgery: 7/26/2019    Procedure(s):  ESOPHAGOGASTRODUODENOSCOPY (EGD)  Indication: Gastric ulce by mouth daily with breakfast.   Disp:  Rfl:  Taking   simvastatin 40 MG Oral Tab Take 40 mg by mouth.  Disp:  Rfl:  Taking   lisinopril 10 MG Oral Tab  Disp:  Rfl: 3 Taking   Levothyroxine Sodium (SYNTHROID) 50 MCG Oral Tab Take 50 mcg by mouth before arely Not on file        Attends Muslim service: Not on file        Active member of club or organization: Not on file        Attends meetings of clubs or organizations: Not on file        Relationship status: Not on file      Intimate partner violence: planned.   LITZY Salazar  7/26/2019 1:04 PM

## 2019-07-27 VITALS
DIASTOLIC BLOOD PRESSURE: 68 MMHG | HEIGHT: 64 IN | HEART RATE: 69 BPM | RESPIRATION RATE: 14 BRPM | WEIGHT: 237 LBS | BODY MASS INDEX: 40.46 KG/M2 | SYSTOLIC BLOOD PRESSURE: 141 MMHG | OXYGEN SATURATION: 99 %

## 2019-07-27 NOTE — TELEPHONE ENCOUNTER
Apryl Cline, the EGD done on July 26th did not show any ulcers and h pylori testing was negative. She has cleared the infection.    Pat    RN to fax over results of endoscopy and path to her primary

## 2019-07-29 NOTE — TELEPHONE ENCOUNTER
EGD op/path report faxed to PCP, Dr. Flaco Marques at [phone], fax confirmation received 7/29/19 @ [phone].

## 2019-09-05 ENCOUNTER — OFFICE VISIT (OUTPATIENT)
Dept: SURGERY | Facility: CLINIC | Age: 44
End: 2019-09-05
Payer: COMMERCIAL

## 2019-09-05 VITALS — BODY MASS INDEX: 39.8 KG/M2 | WEIGHT: 233.13 LBS | HEIGHT: 64 IN

## 2019-09-05 DIAGNOSIS — E66.01 CLASS 2 SEVERE OBESITY DUE TO EXCESS CALORIES WITH SERIOUS COMORBIDITY AND BODY MASS INDEX (BMI) OF 39.0 TO 39.9 IN ADULT (HCC): Primary | ICD-10-CM

## 2019-09-05 PROCEDURE — 97803 MED NUTRITION INDIV SUBSEQ: CPT | Performed by: DIETITIAN, REGISTERED

## 2019-09-05 NOTE — PROGRESS NOTES
[address] [place] [address] AND WEIGHT LOSS CLINIC  [address] [place] [address]  Dept: [phone]  Loc: [phone]    09/05/19      Bariatric Follow-up Nutrition Session    Lindsey Fallon is a 40year old female.      Janeth results found for: THIAMINE   No results found for: VITB1  No results found for: FOLIC     Meds:     Current Outpatient Medications:   •  omeprazole 20 MG Oral Capsule Delayed Release, Take 1 capsule (20 mg total) by mouth every morning., Disp: 30 capsule, Other: None currently  Protein supplements:  Premier Protein     Activity Level: work related  · Type: walk  · Duration: 56947 steps  · Frequency: per day    Other: Eating habits are much better, pt is now keeping consistent food records, exceeding protein

## 2019-09-19 ENCOUNTER — LAB ENCOUNTER (OUTPATIENT)
Dept: LAB | Facility: HOSPITAL | Age: 44
End: 2019-09-19
Attending: INTERNAL MEDICINE
Payer: COMMERCIAL

## 2019-09-19 DIAGNOSIS — E78.00 PURE HYPERCHOLESTEROLEMIA: ICD-10-CM

## 2019-09-19 DIAGNOSIS — I10 ESSENTIAL HYPERTENSION: ICD-10-CM

## 2019-09-19 DIAGNOSIS — E11.9 TYPE 2 DIABETES MELLITUS WITHOUT COMPLICATION, WITHOUT LONG-TERM CURRENT USE OF INSULIN (HCC): ICD-10-CM

## 2019-09-19 DIAGNOSIS — E66.01 MORBID OBESITY WITH BMI OF 40.0-44.9, ADULT (HCC): ICD-10-CM

## 2019-09-19 LAB
ALBUMIN SERPL-MCNC: 3.4 G/DL (ref 3.4–5)
ALBUMIN/GLOB SERPL: 0.9 {RATIO} (ref 1–2)
ALP LIVER SERPL-CCNC: 76 U/L (ref 37–98)
ALT SERPL-CCNC: 23 U/L (ref 13–56)
ANION GAP SERPL CALC-SCNC: 3 MMOL/L (ref 0–18)
AST SERPL-CCNC: 16 U/L (ref 15–37)
BASOPHILS # BLD AUTO: 0.03 X10(3) UL (ref 0–0.2)
BASOPHILS NFR BLD AUTO: 0.4 %
BILIRUB SERPL-MCNC: 0.3 MG/DL (ref 0.1–2)
BUN BLD-MCNC: 10 MG/DL (ref 7–18)
BUN/CREAT SERPL: 12.7 (ref 10–20)
CALCIUM BLD-MCNC: 9.1 MG/DL (ref 8.5–10.1)
CHLORIDE SERPL-SCNC: 110 MMOL/L (ref 98–112)
CHOLEST SMN-MCNC: 181 MG/DL (ref ?–200)
CO2 SERPL-SCNC: 27 MMOL/L (ref 21–32)
CREAT BLD-MCNC: 0.79 MG/DL (ref 0.55–1.02)
DEPRECATED HBV CORE AB SER IA-ACNC: 57.5 NG/ML (ref 12–240)
DEPRECATED RDW RBC AUTO: 47.8 FL (ref 35.1–46.3)
EOSINOPHIL # BLD AUTO: 0.14 X10(3) UL (ref 0–0.7)
EOSINOPHIL NFR BLD AUTO: 2.1 %
ERYTHROCYTE [DISTWIDTH] IN BLOOD BY AUTOMATED COUNT: 14.1 % (ref 11–15)
EST. AVERAGE GLUCOSE BLD GHB EST-MCNC: 177 MG/DL (ref 68–126)
FOLATE SERPL-MCNC: 7.1 NG/ML (ref 8.7–?)
GLOBULIN PLAS-MCNC: 4 G/DL (ref 2.8–4.4)
GLUCOSE BLD-MCNC: 128 MG/DL (ref 70–99)
HAV IGM SER QL: 1.9 MG/DL (ref 1.6–2.6)
HBA1C MFR BLD HPLC: 7.8 % (ref ?–5.7)
HCT VFR BLD AUTO: 41.1 % (ref 35–48)
HDLC SERPL-MCNC: 43 MG/DL (ref 40–59)
HGB BLD-MCNC: 13 G/DL (ref 12–16)
IMM GRANULOCYTES # BLD AUTO: 0.01 X10(3) UL (ref 0–1)
IMM GRANULOCYTES NFR BLD: 0.1 %
IRON SATURATION: 15 % (ref 15–50)
IRON SERPL-MCNC: 55 UG/DL (ref 50–170)
LDLC SERPL CALC-MCNC: 124 MG/DL (ref ?–100)
LYMPHOCYTES # BLD AUTO: 2.83 X10(3) UL (ref 1–4)
LYMPHOCYTES NFR BLD AUTO: 41.7 %
M PROTEIN MFR SERPL ELPH: 7.4 G/DL (ref 6.4–8.2)
MCH RBC QN AUTO: 28.9 PG (ref 26–34)
MCHC RBC AUTO-ENTMCNC: 31.6 G/DL (ref 31–37)
MCV RBC AUTO: 91.3 FL (ref 80–100)
MONOCYTES # BLD AUTO: 0.41 X10(3) UL (ref 0.1–1)
MONOCYTES NFR BLD AUTO: 6 %
NEUTROPHILS # BLD AUTO: 3.36 X10 (3) UL (ref 1.5–7.7)
NEUTROPHILS # BLD AUTO: 3.36 X10(3) UL (ref 1.5–7.7)
NEUTROPHILS NFR BLD AUTO: 49.7 %
NONHDLC SERPL-MCNC: 138 MG/DL (ref ?–130)
OSMOLALITY SERPL CALC.SUM OF ELEC: 291 MOSM/KG (ref 275–295)
PATIENT FASTING: YES
PATIENT FASTING: YES
PHOSPHATE SERPL-MCNC: 3.2 MG/DL (ref 2.5–4.9)
PLATELET # BLD AUTO: 275 10(3)UL (ref 150–450)
POTASSIUM SERPL-SCNC: 4.4 MMOL/L (ref 3.5–5.1)
RBC # BLD AUTO: 4.5 X10(6)UL (ref 3.8–5.3)
SODIUM SERPL-SCNC: 140 MMOL/L (ref 136–145)
TOTAL IRON BINDING CAPACITY: 364 UG/DL (ref 240–450)
TRANSFERRIN SERPL-MCNC: 244 MG/DL (ref 200–360)
TRIGL SERPL-MCNC: 68 MG/DL (ref 30–149)
TSI SER-ACNC: 1.71 MIU/ML (ref 0.36–3.74)
VIT B12 SERPL-MCNC: 609 PG/ML (ref 193–986)
VLDLC SERPL CALC-MCNC: 14 MG/DL (ref 0–30)
WBC # BLD AUTO: 6.8 X10(3) UL (ref 4–11)

## 2019-09-19 PROCEDURE — 83540 ASSAY OF IRON: CPT

## 2019-09-19 PROCEDURE — 83735 ASSAY OF MAGNESIUM: CPT

## 2019-09-19 PROCEDURE — 84100 ASSAY OF PHOSPHORUS: CPT

## 2019-09-19 PROCEDURE — 36415 COLL VENOUS BLD VENIPUNCTURE: CPT

## 2019-09-19 PROCEDURE — 84466 ASSAY OF TRANSFERRIN: CPT

## 2019-09-19 PROCEDURE — 82728 ASSAY OF FERRITIN: CPT

## 2019-09-19 PROCEDURE — 82607 VITAMIN B-12: CPT

## 2019-09-19 PROCEDURE — 80061 LIPID PANEL: CPT

## 2019-09-19 PROCEDURE — 84443 ASSAY THYROID STIM HORMONE: CPT

## 2019-09-19 PROCEDURE — 82746 ASSAY OF FOLIC ACID SERUM: CPT

## 2019-09-19 PROCEDURE — 83036 HEMOGLOBIN GLYCOSYLATED A1C: CPT

## 2019-09-19 PROCEDURE — 85025 COMPLETE CBC W/AUTO DIFF WBC: CPT

## 2019-09-19 PROCEDURE — 80053 COMPREHEN METABOLIC PANEL: CPT

## 2019-09-19 PROCEDURE — 82306 VITAMIN D 25 HYDROXY: CPT

## 2019-09-19 PROCEDURE — 84425 ASSAY OF VITAMIN B-1: CPT

## 2019-09-20 LAB — 25(OH)D3 SERPL-MCNC: 20.8 NG/ML (ref 30–100)

## 2019-09-24 LAB — VITAMIN B1 (THIAMINE), WHOLE B: 92 NMOL/L

## 2019-09-26 ENCOUNTER — TELEPHONE (OUTPATIENT)
Dept: SURGERY | Facility: CLINIC | Age: 44
End: 2019-09-26

## 2019-10-17 ENCOUNTER — TELEPHONE (OUTPATIENT)
Dept: SURGERY | Facility: CLINIC | Age: 44
End: 2019-10-17

## 2019-10-17 ENCOUNTER — OFFICE VISIT (OUTPATIENT)
Dept: SURGERY | Facility: CLINIC | Age: 44
End: 2019-10-17
Payer: COMMERCIAL

## 2019-10-17 VITALS
BODY MASS INDEX: 39.44 KG/M2 | HEART RATE: 83 BPM | WEIGHT: 231 LBS | OXYGEN SATURATION: 100 % | RESPIRATION RATE: 16 BRPM | DIASTOLIC BLOOD PRESSURE: 83 MMHG | SYSTOLIC BLOOD PRESSURE: 131 MMHG | HEIGHT: 64.1 IN

## 2019-10-17 DIAGNOSIS — E66.01 MORBID OBESITY WITH BMI OF 40.0-44.9, ADULT (HCC): ICD-10-CM

## 2019-10-17 DIAGNOSIS — E66.01 MORBID OBESITY WITH BMI OF 40.0-44.9, ADULT (HCC): Primary | ICD-10-CM

## 2019-10-17 DIAGNOSIS — I10 ESSENTIAL HYPERTENSION: ICD-10-CM

## 2019-10-17 DIAGNOSIS — E11.9 TYPE 2 DIABETES MELLITUS WITHOUT COMPLICATION, WITHOUT LONG-TERM CURRENT USE OF INSULIN (HCC): Primary | ICD-10-CM

## 2019-10-17 DIAGNOSIS — E78.00 PURE HYPERCHOLESTEROLEMIA: ICD-10-CM

## 2019-10-17 DIAGNOSIS — E55.9 VITAMIN D DEFICIENCY: ICD-10-CM

## 2019-10-17 NOTE — PROGRESS NOTES
[address] [address]  [address] [place] [address]  Dept: [phone]    10/17/2019    BARIATRIC EXISTING PATIENT/FOLLOW UP    HPI:  2/25/16    252. 5     40.8    8/11/16    257. 1     concepts   6/13/19 - some improvement, eating more at home, needs more veggies, still has fried food multiple times/week.  Not logging on MFP, not logging most foods correctly.    9/8/19 - keeping better food records, meeting protein needs, limiting starch HSM     ASSESSMENT:  Evette Angelucci is a 40year [de-identified] old female who is here in continue preoperative evaluation for bariatric surgery.       Continues to make slow and steady progress to grasp the concepts of bariatric surgery.   Discussed goals for nex

## 2019-10-17 NOTE — TELEPHONE ENCOUNTER
Spoke with Alysa Niño at [facility] asked for cardiac clearance letter to be sent to office fax [phone]  Also LM for patient to please call Dr. Felix Steinberg office to have them fax letter on her behalf to hopefully expedite the process.

## 2019-10-21 RX ORDER — ERGOCALCIFEROL (VITAMIN D2) 1250 MCG
50000 CAPSULE ORAL WEEKLY
Qty: 12 CAPSULE | Refills: 2 | Status: SHIPPED | OUTPATIENT
Start: 2019-10-21 | End: 2019-10-31

## 2019-10-31 ENCOUNTER — OFFICE VISIT (OUTPATIENT)
Dept: SURGERY | Facility: CLINIC | Age: 44
End: 2019-10-31
Payer: COMMERCIAL

## 2019-10-31 VITALS
DIASTOLIC BLOOD PRESSURE: 85 MMHG | HEIGHT: 64 IN | OXYGEN SATURATION: 100 % | BODY MASS INDEX: 39.61 KG/M2 | WEIGHT: 232 LBS | SYSTOLIC BLOOD PRESSURE: 129 MMHG | HEART RATE: 64 BPM

## 2019-10-31 DIAGNOSIS — E66.01 MORBID OBESITY WITH BMI OF 40.0-44.9, ADULT (HCC): ICD-10-CM

## 2019-10-31 DIAGNOSIS — I10 ESSENTIAL HYPERTENSION: Primary | ICD-10-CM

## 2019-10-31 DIAGNOSIS — E11.9 TYPE 2 DIABETES MELLITUS WITHOUT COMPLICATION, WITHOUT LONG-TERM CURRENT USE OF INSULIN (HCC): ICD-10-CM

## 2019-10-31 DIAGNOSIS — E78.00 PURE HYPERCHOLESTEROLEMIA: ICD-10-CM

## 2019-10-31 DIAGNOSIS — G47.33 OSA (OBSTRUCTIVE SLEEP APNEA): ICD-10-CM

## 2019-10-31 PROCEDURE — 99214 OFFICE O/P EST MOD 30 MIN: CPT | Performed by: INTERNAL MEDICINE

## 2019-10-31 NOTE — PATIENT INSTRUCTIONS
Outpatient Encounter Medications as of 10/31/2019   Medication Sig Note   • [DISCONTINUED] ergocalciferol (DRISDOL) 35668 units Oral Cap Take 1 capsule (50,000 Units total) by mouth once a week for 12 doses.     • [DISCONTINUED] omeprazole 20 MG Oral Capsul

## 2019-10-31 NOTE — PROGRESS NOTES
[facility] BARIATRIC AND WEIGHT LOSS CLINIC  [address] [address]  Dept: [phone]     Date:   2016    Patient:  Niyah Manriquez  :      1975  MRN:      [id number]    Chief Complaint:  Patient presents with:   Marvin Jeffrey TWICE DAILY, Disp: , Rfl:   Levothyroxine Sodium (SYNTHROID) 50 MCG Oral Tab, Take 50 mcg by mouth before breakfast.  , Disp: , Rfl: 5      Allergies:  Patient has no known allergies.      Social History:    Social History    Socioeconomic History      [employer] ENDOSCOPY     Family History:    Family History   Problem Relation Age of Onset   • Diabetes Mother    • Diabetes Paternal Grandmother    • Cancer Paternal Grandmother         ovarian   • Cancer Paternal Grandfather         prostate        Food Journal  · click, rub or gallop, regular rate and rhythm  Abdomen: soft, non-tender; bowel sounds normal; no masses,  no organomegaly  Extremities: extremities normal, atraumatic, no cyanosis or edema  Pulses: 2+ and symmetric    ASSESSMENT     DIABETES:  The patient walk 10 minutes per day. 4. Increase fruit and vegetable servings to 5-6 per day.       Saw Dr Warden Livingston  Met with RD  Met with psych (cleared)  Met with cardiology (Stress test is essentially normal)  Met with pulmonary (cleared)  Met with GI (cleared)    Flaco

## 2019-11-07 ENCOUNTER — OFFICE VISIT (OUTPATIENT)
Dept: SURGERY | Facility: CLINIC | Age: 44
End: 2019-11-07
Payer: COMMERCIAL

## 2019-11-07 VITALS — WEIGHT: 233.88 LBS | HEIGHT: 64 IN | BODY MASS INDEX: 39.93 KG/M2

## 2019-11-07 DIAGNOSIS — E66.01 CLASS 3 SEVERE OBESITY DUE TO EXCESS CALORIES WITH SERIOUS COMORBIDITY AND BODY MASS INDEX (BMI) OF 40.0 TO 44.9 IN ADULT (HCC): Primary | ICD-10-CM

## 2019-11-07 PROCEDURE — 97803 MED NUTRITION INDIV SUBSEQ: CPT | Performed by: DIETITIAN, REGISTERED

## 2019-11-07 NOTE — PROGRESS NOTES
[facility] AND WEIGHT LOSS CLINIC  [address] [place] [address]  Dept: [phone]  Loc: [phone]    11/07/19      Bariatric Follow-up Nutrition Session    Kevon Kilgore is a 40year old female.      Janeth 43 09/19/2019     (H) 09/19/2019    VLDL 14 09/19/2019    NONHDLC 138 (H) 09/19/2019    CALCNONHDL 124 04/26/2016        Vitamins/Minerals:  Lab Results   Component Value Date    B12 609 09/19/2019    VITB12 637 04/26/2016     Lab Results   Componen vegetables some days    Patient has made some modifications and adjustments to diet: yes, is sticking w/ moderate portions, keeping consistent food records, prioritizing protein, and limiting starch intake    Food intolerances:  None reported  Vitamin/mine

## 2019-12-05 ENCOUNTER — OFFICE VISIT (OUTPATIENT)
Dept: SURGERY | Facility: CLINIC | Age: 44
End: 2019-12-05
Payer: COMMERCIAL

## 2019-12-05 VITALS — BODY MASS INDEX: 39.63 KG/M2 | HEIGHT: 64 IN | WEIGHT: 232.13 LBS

## 2019-12-05 DIAGNOSIS — E66.01 CLASS 2 SEVERE OBESITY DUE TO EXCESS CALORIES WITH SERIOUS COMORBIDITY AND BODY MASS INDEX (BMI) OF 39.0 TO 39.9 IN ADULT (HCC): Primary | ICD-10-CM

## 2019-12-05 PROCEDURE — 97803 MED NUTRITION INDIV SUBSEQ: CPT | Performed by: DIETITIAN, REGISTERED

## 2019-12-05 NOTE — PROGRESS NOTES
[address] [place] [address] AND WEIGHT LOSS CLINIC  [address] [place] [address]  Dept: [phone]  Loc: [phone]    12/05/19      Bariatric Follow-up Nutrition Session    Heath Garrett is a 40year old female.      Janeth 09/19/2019     (H) 09/19/2019    VLDL 14 09/19/2019    NONHDLC 138 (H) 09/19/2019    CALCNONHDL 124 04/26/2016        Vitamins/Minerals:  Lab Results   Component Value Date    B12 609 09/19/2019    VITB12 637 04/26/2016     Lab Results   Component V days  Inadequate in:  vegetables some days    Patient has made some modifications and adjustments to diet: yes, is sticking w/ moderate portions, keeping consistent food records, prioritizing protein, and limiting starch intake    Food intolerances:  None

## 2020-01-02 ENCOUNTER — OFFICE VISIT (OUTPATIENT)
Dept: SURGERY | Facility: CLINIC | Age: 45
End: 2020-01-02
Payer: COMMERCIAL

## 2020-01-02 ENCOUNTER — TELEPHONE (OUTPATIENT)
Dept: SURGERY | Facility: CLINIC | Age: 45
End: 2020-01-02

## 2020-01-02 VITALS
RESPIRATION RATE: 16 BRPM | HEART RATE: 80 BPM | BODY MASS INDEX: 40.29 KG/M2 | OXYGEN SATURATION: 100 % | SYSTOLIC BLOOD PRESSURE: 132 MMHG | HEIGHT: 64.1 IN | WEIGHT: 236 LBS | DIASTOLIC BLOOD PRESSURE: 81 MMHG

## 2020-01-02 DIAGNOSIS — E11.9 TYPE 2 DIABETES MELLITUS WITHOUT COMPLICATION, WITHOUT LONG-TERM CURRENT USE OF INSULIN (HCC): Primary | ICD-10-CM

## 2020-01-02 DIAGNOSIS — E66.01 MORBID OBESITY WITH BMI OF 40.0-44.9, ADULT (HCC): ICD-10-CM

## 2020-01-02 NOTE — TELEPHONE ENCOUNTER
Hi Dr Wilkinson Sic,    Pls review folic and Vit D and pls advise.     Thank you,    Anabella Sawant

## 2020-01-02 NOTE — PROGRESS NOTES
[address] [address] [place] [address]  Dept: [phone]    1/2/2020    BARIATRIC EXISTING PATIENT/FOLLOW UP    HPI:  2/25/16    252. 5     40.8    8/11/16    257. 1      4 cereals.  Cooking more at home, but home cooked meals are carb heavy.  Needs additional counseling to review basic nutrition concepts   6/13/19 - some improvement, eating more at home, needs more veggies, still has fried food multiple times/week.  Not logg Negative except HPI  : No hematuria or dysuria  Heme: No history of bleeding or bruising  MS: No back pain, neck pain, weakness      PHYSICAL EXAM:     Vital Signs: There were no vitals taken for this visit.   BMI:  There is no height or weight on file t

## 2020-01-03 ENCOUNTER — TELEPHONE (OUTPATIENT)
Dept: SURGERY | Facility: CLINIC | Age: 45
End: 2020-01-03

## 2020-01-09 ENCOUNTER — OFFICE VISIT (OUTPATIENT)
Dept: SURGERY | Facility: CLINIC | Age: 45
End: 2020-01-09
Payer: COMMERCIAL

## 2020-01-09 VITALS — WEIGHT: 237.69 LBS | BODY MASS INDEX: 40.58 KG/M2 | HEIGHT: 64 IN

## 2020-01-09 DIAGNOSIS — E66.01 CLASS 3 SEVERE OBESITY DUE TO EXCESS CALORIES WITH SERIOUS COMORBIDITY AND BODY MASS INDEX (BMI) OF 40.0 TO 44.9 IN ADULT (HCC): Primary | ICD-10-CM

## 2020-01-09 PROCEDURE — 97803 MED NUTRITION INDIV SUBSEQ: CPT | Performed by: DIETITIAN, REGISTERED

## 2020-01-09 NOTE — PROGRESS NOTES
[address] [facility] AND WEIGHT LOSS CLINIC  [address] [place] [address]  Dept: [phone]  Loc: [phone]    01/09/20      Bariatric Follow-up Nutrition Session    Heath Garrett is a 40year old female.      Janeth 43 09/19/2019     (H) 09/19/2019    VLDL 14 09/19/2019    NONHDLC 138 (H) 09/19/2019    CALCNONHDL 124 04/26/2016        Vitamins/Minerals:  Lab Results   Component Value Date    B12 609 09/19/2019    VITB12 637 04/26/2016     Lab Results   Componen holidays.      Patient has made some modifications and adjustments to diet: yes, is sticking w/ moderate portions, keeping consistent food records, prioritizing protein, and limiting starch intake    Food intolerances:  None reported  Vitamin/mineral supple Yes    Martinez Schmidt MS RD LDN  Face-to-face time spent with pt: 30 minutes

## 2020-01-30 ENCOUNTER — APPOINTMENT (OUTPATIENT)
Dept: LAB | Facility: HOSPITAL | Age: 45
End: 2020-01-30
Attending: SURGERY
Payer: COMMERCIAL

## 2020-01-30 ENCOUNTER — OFFICE VISIT (OUTPATIENT)
Dept: SURGERY | Facility: CLINIC | Age: 45
End: 2020-01-30
Payer: COMMERCIAL

## 2020-01-30 VITALS
BODY MASS INDEX: 40.29 KG/M2 | DIASTOLIC BLOOD PRESSURE: 77 MMHG | WEIGHT: 236 LBS | RESPIRATION RATE: 16 BRPM | OXYGEN SATURATION: 100 % | HEIGHT: 64.1 IN | HEART RATE: 72 BPM | SYSTOLIC BLOOD PRESSURE: 147 MMHG

## 2020-01-30 DIAGNOSIS — E66.01 MORBID OBESITY WITH BMI OF 40.0-44.9, ADULT (HCC): Primary | ICD-10-CM

## 2020-01-30 DIAGNOSIS — E66.01 MORBID OBESITY WITH BMI OF 40.0-44.9, ADULT (HCC): ICD-10-CM

## 2020-01-30 DIAGNOSIS — E11.9 TYPE 2 DIABETES MELLITUS WITHOUT COMPLICATION, WITHOUT LONG-TERM CURRENT USE OF INSULIN (HCC): ICD-10-CM

## 2020-01-30 LAB
ALBUMIN SERPL-MCNC: 3.4 G/DL (ref 3.4–5)
ALBUMIN/GLOB SERPL: 0.8 {RATIO} (ref 1–2)
ALP LIVER SERPL-CCNC: 71 U/L (ref 37–98)
ALT SERPL-CCNC: 27 U/L (ref 13–56)
ANION GAP SERPL CALC-SCNC: 4 MMOL/L (ref 0–18)
AST SERPL-CCNC: 13 U/L (ref 15–37)
BILIRUB SERPL-MCNC: 0.4 MG/DL (ref 0.1–2)
BUN BLD-MCNC: 7 MG/DL (ref 7–18)
BUN/CREAT SERPL: 8.5 (ref 10–20)
CALCIUM BLD-MCNC: 9 MG/DL (ref 8.5–10.1)
CHLORIDE SERPL-SCNC: 107 MMOL/L (ref 98–112)
CO2 SERPL-SCNC: 28 MMOL/L (ref 21–32)
CREAT BLD-MCNC: 0.82 MG/DL (ref 0.55–1.02)
DEPRECATED RDW RBC AUTO: 44.6 FL (ref 35.1–46.3)
ERYTHROCYTE [DISTWIDTH] IN BLOOD BY AUTOMATED COUNT: 13.7 % (ref 11–15)
EST. AVERAGE GLUCOSE BLD GHB EST-MCNC: 177 MG/DL (ref 68–126)
GLOBULIN PLAS-MCNC: 4.3 G/DL (ref 2.8–4.4)
GLUCOSE BLD-MCNC: 139 MG/DL (ref 70–99)
HBA1C MFR BLD HPLC: 7.8 % (ref ?–5.7)
HCT VFR BLD AUTO: 42.8 % (ref 35–48)
HGB BLD-MCNC: 13.3 G/DL (ref 12–16)
M PROTEIN MFR SERPL ELPH: 7.7 G/DL (ref 6.4–8.2)
MCH RBC QN AUTO: 27.7 PG (ref 26–34)
MCHC RBC AUTO-ENTMCNC: 31.1 G/DL (ref 31–37)
MCV RBC AUTO: 89.2 FL (ref 80–100)
OSMOLALITY SERPL CALC.SUM OF ELEC: 288 MOSM/KG (ref 275–295)
PATIENT FASTING Y/N/NP: YES
PLATELET # BLD AUTO: 252 10(3)UL (ref 150–450)
POTASSIUM SERPL-SCNC: 4 MMOL/L (ref 3.5–5.1)
RBC # BLD AUTO: 4.8 X10(6)UL (ref 3.8–5.3)
SODIUM SERPL-SCNC: 139 MMOL/L (ref 136–145)
WBC # BLD AUTO: 6.1 X10(3) UL (ref 4–11)

## 2020-01-30 PROCEDURE — 83036 HEMOGLOBIN GLYCOSYLATED A1C: CPT

## 2020-01-30 PROCEDURE — 36415 COLL VENOUS BLD VENIPUNCTURE: CPT

## 2020-01-30 PROCEDURE — 80053 COMPREHEN METABOLIC PANEL: CPT

## 2020-01-30 PROCEDURE — 85027 COMPLETE CBC AUTOMATED: CPT

## 2020-01-30 NOTE — PROGRESS NOTES
Reviewed ERAS pre-operative requirements with patient. Handouts given to patient. Patient understands to purchase 2 Ensure Clear Carbohydrate shakes.  Patient is to drink one Ensure Clear Carbohydrate shake 12 hours before surgery and one Ensure Clear Car

## 2020-01-30 NOTE — PROGRESS NOTES
[address] [address] [place] [address]  Dept: [phone]    1/30/2020    BARIATRIC EXISTING PATIENT/FOLLOW UP    HPI:  2/25/16    252. 5     40.8    8/11/16    257. 1       didn't eat fast food at all the pst 2 weeks, but had home run inn last night and had pizza last week. Logging, but not consistently and after review, not very specific about portion sizes.   Reviewed how to log portio sizes with patient and discussed how t better - baking fish, doing premier shakes in morning.      1/9/20 - still high in fast food and some meals lacking in produce.   Logging some meals incorrectly, reviewed how to log homemade vs. restaurant meals.  Not logging veggies.  No formal exercise ro in [address] [place] [address]. Making positive changes, but I still think needs to work on some of these things a little bit more prior to the surgery. I will discuss this with the dietitian. Goals for next time.   1. Get labs - discussed the importance of good glycemi

## 2020-02-10 ENCOUNTER — OFFICE VISIT (OUTPATIENT)
Dept: SURGERY | Facility: CLINIC | Age: 45
End: 2020-02-10
Payer: COMMERCIAL

## 2020-02-10 VITALS — HEIGHT: 64 IN | WEIGHT: 237.81 LBS | BODY MASS INDEX: 40.6 KG/M2

## 2020-02-10 DIAGNOSIS — E66.01 CLASS 3 SEVERE OBESITY DUE TO EXCESS CALORIES WITH SERIOUS COMORBIDITY AND BODY MASS INDEX (BMI) OF 40.0 TO 44.9 IN ADULT (HCC): Primary | ICD-10-CM

## 2020-02-10 PROCEDURE — 97803 MED NUTRITION INDIV SUBSEQ: CPT | Performed by: DIETITIAN, REGISTERED

## 2020-02-10 NOTE — PROGRESS NOTES
[address]  [address] [address]  Dept: [phone]  Loc: [phone]    02/10/20    Bariatric Liquid Protein Nutrition Session    Zoë Bardales is a 40year old female    Asse 01/30/2020    HGB 13.0 09/19/2019    HGB 13.5 10/08/2018      Lab Results   Component Value Date    .0 01/30/2020    .0 09/19/2019     10/08/2018        Diabetes:    HGBA1C:    Lab Results   Component Value Date    A1C 7.8 (H) 01/30/20 days, however, pt has cut down significantly and is making better choices  Inadequate in:  milk and fruits  Patient has made some modifications and adjustments to diet: yes, is sticking w/ moderate portions, keeping consistent food records, prioritizing pr supplementation, Reinforce goals, Calorie/protein intake and Other:  pre-op labs  Patient understands protein requirements? Yes- is a work in progress to reach goal consistently  Patient understand fluid requirements (amount and method of intake)?  Yes  Melony Urias

## 2020-02-22 ENCOUNTER — HOSPITAL ENCOUNTER (EMERGENCY)
Facility: HOSPITAL | Age: 45
Discharge: HOME | End: 2020-02-23
Attending: EMERGENCY MEDICINE
Payer: COMMERCIAL

## 2020-02-22 VITALS
RESPIRATION RATE: 14 BRPM | TEMPERATURE: 99 F | HEART RATE: 80 BPM | SYSTOLIC BLOOD PRESSURE: 125 MMHG | DIASTOLIC BLOOD PRESSURE: 81 MMHG | WEIGHT: 213 LBS | BODY MASS INDEX: 37 KG/M2 | OXYGEN SATURATION: 98 %

## 2020-02-22 DIAGNOSIS — R11.2 NAUSEA VOMITING AND DIARRHEA: ICD-10-CM

## 2020-02-22 DIAGNOSIS — R19.7 NAUSEA VOMITING AND DIARRHEA: ICD-10-CM

## 2020-02-22 DIAGNOSIS — B34.9 VIRAL SYNDROME: Primary | ICD-10-CM

## 2020-02-22 LAB
FLUAV + FLUBV RNA SPEC NAA+PROBE: NEGATIVE

## 2020-02-22 PROCEDURE — 87631 RESP VIRUS 3-5 TARGETS: CPT | Performed by: EMERGENCY MEDICINE

## 2020-02-22 PROCEDURE — 99283 EMERGENCY DEPT VISIT LOW MDM: CPT

## 2020-02-22 RX ORDER — ONDANSETRON 4 MG/1
4 TABLET, ORALLY DISINTEGRATING ORAL ONCE
Status: COMPLETED | OUTPATIENT
Start: 2020-02-22 | End: 2020-02-22

## 2020-02-22 RX ORDER — ACETAMINOPHEN 325 MG/1
650 TABLET ORAL ONCE
Status: COMPLETED | OUTPATIENT
Start: 2020-02-22 | End: 2020-02-22

## 2020-02-23 RX ORDER — ONDANSETRON 4 MG/1
4 TABLET, ORALLY DISINTEGRATING ORAL EVERY 8 HOURS PRN
Qty: 30 TABLET | Refills: 0 | Status: SHIPPED | OUTPATIENT
Start: 2020-02-23 | End: 2020-03-01

## 2020-02-23 NOTE — ED INITIAL ASSESSMENT (HPI)
Pt c/o fevers and chills that started today. States temp was 109 earlier. No ibuprofen or tylenol taken. NAD.

## 2020-02-23 NOTE — ED NOTES
Patient states she has been having n/v/d, has been able to eat/drink but states everything goes right through her.  +fevers/chills at home since this am.

## 2020-02-23 NOTE — ED PROVIDER NOTES
Patient Seen in: [facility] AND [facility] Emergency Department      History   Patient presents with:  Fever  Body ache and/or chills    Stated Complaint: virus    HPI     40year old female with dm, htn, hcl, sleep apnea who presents with fever/chills, multiple light.   Neck:      Musculoskeletal: Full passive range of motion without pain, normal range of motion and neck supple. Normal range of motion. No neck rigidity. Cardiovascular:      Rate and Rhythm: Normal rate and regular rhythm.       Heart sounds: Nor 2/23/2020 12:54 AM    START taking these medications    ondansetron 4 MG Oral Tablet Dispersible  Take 1 tablet (4 mg total) by mouth every 8 (eight) hours as needed for Nausea., Normal, Disp-30 tablet, R-0

## 2021-02-25 ENCOUNTER — HOSPITAL ENCOUNTER (OUTPATIENT)
Facility: HOSPITAL | Age: 46
Setting detail: OBSERVATION
Discharge: HOME | DRG: 638 | End: 2021-02-26
Attending: EMERGENCY MEDICINE | Admitting: INTERNAL MEDICINE
Payer: COMMERCIAL

## 2021-02-25 DIAGNOSIS — R73.9 HYPERGLYCEMIA: Primary | ICD-10-CM

## 2021-02-25 LAB
ACETONE SERPL QL: NEGATIVE
ANION GAP SERPL CALC-SCNC: 6 MMOL/L (ref 0–18)
BASE EXCESS BLD CALC-SCNC: -3.3 MMOL/L (ref ?–2)
BASOPHILS # BLD AUTO: 0.03 X10(3) UL (ref 0–0.2)
BASOPHILS NFR BLD AUTO: 0.3 %
BILIRUB UR QL: NEGATIVE
BUN BLD-MCNC: 20 MG/DL (ref 7–18)
BUN/CREAT SERPL: 15.6 (ref 10–20)
CALCIUM BLD-MCNC: 9 MG/DL (ref 8.5–10.1)
CHLORIDE SERPL-SCNC: 98 MMOL/L (ref 98–112)
CLARITY UR: CLEAR
CO2 SERPL-SCNC: 26 MMOL/L (ref 21–32)
COLOR UR: YELLOW
CREAT BLD-MCNC: 1.28 MG/DL
DEPRECATED RDW RBC AUTO: 42.7 FL (ref 35.1–46.3)
EOSINOPHIL # BLD AUTO: 0.26 X10(3) UL (ref 0–0.7)
EOSINOPHIL NFR BLD AUTO: 2.9 %
ERYTHROCYTE [DISTWIDTH] IN BLOOD BY AUTOMATED COUNT: 13.3 % (ref 11–15)
EST. AVERAGE GLUCOSE BLD GHB EST-MCNC: 309 MG/DL (ref 68–126)
GLUCOSE BLD-MCNC: 556 MG/DL (ref 70–99)
GLUCOSE BLDC GLUCOMTR-MCNC: 122 MG/DL (ref 70–99)
GLUCOSE BLDC GLUCOMTR-MCNC: 142 MG/DL (ref 70–99)
GLUCOSE BLDC GLUCOMTR-MCNC: 146 MG/DL (ref 70–99)
GLUCOSE BLDC GLUCOMTR-MCNC: 154 MG/DL (ref 70–99)
GLUCOSE BLDC GLUCOMTR-MCNC: 197 MG/DL (ref 70–99)
GLUCOSE BLDC GLUCOMTR-MCNC: 214 MG/DL (ref 70–99)
GLUCOSE BLDC GLUCOMTR-MCNC: 239 MG/DL (ref 70–99)
GLUCOSE BLDC GLUCOMTR-MCNC: 281 MG/DL (ref 70–99)
GLUCOSE BLDC GLUCOMTR-MCNC: 317 MG/DL (ref 70–99)
GLUCOSE BLDC GLUCOMTR-MCNC: 364 MG/DL (ref 70–99)
GLUCOSE BLDC GLUCOMTR-MCNC: 381 MG/DL (ref 70–99)
GLUCOSE BLDC GLUCOMTR-MCNC: 430 MG/DL (ref 70–99)
GLUCOSE BLDC GLUCOMTR-MCNC: 479 MG/DL (ref 70–99)
GLUCOSE BLDC GLUCOMTR-MCNC: 527 MG/DL (ref 70–99)
GLUCOSE UR-MCNC: >=500 MG/DL
HBA1C MFR BLD HPLC: 12.4 % (ref ?–5.7)
HCO3 BLDV-SCNC: 21.3 MEQ/L (ref 22–26)
HCT VFR BLD AUTO: 43.4 %
HGB BLD-MCNC: 14.3 G/DL
HGB UR QL STRIP.AUTO: NEGATIVE
IMM GRANULOCYTES # BLD AUTO: 0.02 X10(3) UL (ref 0–1)
IMM GRANULOCYTES NFR BLD: 0.2 %
LEUKOCYTE ESTERASE UR QL STRIP.AUTO: NEGATIVE
LYMPHOCYTES # BLD AUTO: 3.04 X10(3) UL (ref 1–4)
LYMPHOCYTES NFR BLD AUTO: 33.7 %
MCH RBC QN AUTO: 28.9 PG (ref 26–34)
MCHC RBC AUTO-ENTMCNC: 32.9 G/DL (ref 31–37)
MCV RBC AUTO: 87.9 FL
MONOCYTES # BLD AUTO: 0.55 X10(3) UL (ref 0.1–1)
MONOCYTES NFR BLD AUTO: 6.1 %
NEUTROPHILS # BLD AUTO: 5.13 X10 (3) UL (ref 1.5–7.7)
NEUTROPHILS # BLD AUTO: 5.13 X10(3) UL (ref 1.5–7.7)
NEUTROPHILS NFR BLD AUTO: 56.8 %
NITRITE UR QL STRIP.AUTO: NEGATIVE
OSMOLALITY SERPL CALC.SUM OF ELEC: 298 MOSM/KG (ref 275–295)
PCO2 BLDV: 40 MM HG (ref 38–50)
PH BLDV: 7.35 [PH] (ref 7.32–7.43)
PH UR: 5 [PH] (ref 5–8)
PLATELET # BLD AUTO: 241 10(3)UL (ref 150–450)
PO2 BLDV: 33 MM HG (ref 35–40)
POTASSIUM SERPL-SCNC: 4.6 MMOL/L (ref 3.5–5.1)
PROT UR-MCNC: NEGATIVE MG/DL
PUNCTURE CHARGE: NO
RBC # BLD AUTO: 4.94 X10(6)UL
SAO2 % BLDV: 69.2 % (ref 60–85)
SARS-COV-2 RNA RESP QL NAA+PROBE: NOT DETECTED
SODIUM SERPL-SCNC: 130 MMOL/L (ref 136–145)
SP GR UR STRIP: 1.03 (ref 1–1.03)
UROBILINOGEN UR STRIP-ACNC: <2
WBC # BLD AUTO: 9 X10(3) UL (ref 4–11)

## 2021-02-25 PROCEDURE — 3046F HEMOGLOBIN A1C LEVEL >9.0%: CPT | Performed by: INTERNAL MEDICINE

## 2021-02-25 PROCEDURE — 99222 1ST HOSP IP/OBS MODERATE 55: CPT | Performed by: INTERNAL MEDICINE

## 2021-02-25 RX ORDER — ONDANSETRON 2 MG/ML
4 INJECTION INTRAMUSCULAR; INTRAVENOUS EVERY 6 HOURS PRN
Status: DISCONTINUED | OUTPATIENT
Start: 2021-02-25 | End: 2021-02-26

## 2021-02-25 RX ORDER — ENOXAPARIN SODIUM 100 MG/ML
40 INJECTION SUBCUTANEOUS DAILY
Status: DISCONTINUED | OUTPATIENT
Start: 2021-02-25 | End: 2021-02-25

## 2021-02-25 RX ORDER — ATORVASTATIN CALCIUM 20 MG/1
20 TABLET, FILM COATED ORAL NIGHTLY
Status: DISCONTINUED | OUTPATIENT
Start: 2021-02-25 | End: 2021-02-26

## 2021-02-25 RX ORDER — LEVOTHYROXINE SODIUM 0.05 MG/1
50 TABLET ORAL
Status: DISCONTINUED | OUTPATIENT
Start: 2021-02-25 | End: 2021-02-26

## 2021-02-25 RX ORDER — ENOXAPARIN SODIUM 100 MG/ML
0.5 INJECTION SUBCUTANEOUS DAILY
Status: DISCONTINUED | OUTPATIENT
Start: 2021-02-26 | End: 2021-02-26

## 2021-02-25 RX ORDER — DEXTROSE MONOHYDRATE 25 G/50ML
50 INJECTION, SOLUTION INTRAVENOUS
Status: DISCONTINUED | OUTPATIENT
Start: 2021-02-25 | End: 2021-02-26

## 2021-02-25 RX ORDER — LISINOPRIL 10 MG/1
10 TABLET ORAL DAILY
Status: DISCONTINUED | OUTPATIENT
Start: 2021-02-25 | End: 2021-02-26

## 2021-02-25 RX ORDER — DEXTROSE MONOHYDRATE 25 G/50ML
50 INJECTION, SOLUTION INTRAVENOUS
Status: DISCONTINUED | OUTPATIENT
Start: 2021-02-25 | End: 2021-02-25

## 2021-02-25 RX ORDER — ACETAMINOPHEN 325 MG/1
650 TABLET ORAL EVERY 6 HOURS PRN
Status: DISCONTINUED | OUTPATIENT
Start: 2021-02-25 | End: 2021-02-26

## 2021-02-25 RX ORDER — SODIUM CHLORIDE 9 MG/ML
INJECTION, SOLUTION INTRAVENOUS CONTINUOUS
Status: DISCONTINUED | OUTPATIENT
Start: 2021-02-25 | End: 2021-02-25

## 2021-02-25 NOTE — PAYOR COMM NOTE
--------------  ADMISSION REVIEW     Payor: [facility]  Subscriber #:  [id number]  Authorization Number: [id number]    Admit date: 2/25/21  Admit time: 5754       Patient Seen in: [facility] Emergency Department    History   Patient p PANEL (8)    Collection Time: 02/25/21  1:29 AM   Result Value Ref Range    Glucose 556 (HH) 70 - 99 mg/dL    Sodium 130 (L) 136 - 145 mmol/L    Potassium 4.6 3.5 - 5.1 mmol/L    Chloride 98 98 - 112 mmol/L    CO2 26.0 21.0 - 32.0 mmol/L    Anion Gap 6 0 - Immature Granulocyte % 0.2 %     Disposition and Plan     Clinical Impression:  Hyperglycemia  (primary encounter diagnosis)    Disposition:  Admit  2/25/2021  2:57 am      History & Physical    Date:  2/25/2021  Date of Admission:  2/25/2021    History of symmetric all extremities  Neurologic: Intact motor and sensory.  AAOX3   Back: Symmetric, Normal ROM, no CVA tenderness    Lab Results   Component Value Date    WBC 9.0 02/25/2021    HGB 14.3 02/25/2021    HCT 43.4 02/25/2021    .0 02/25/2021    CRE Action Dose Route User    2/25/2021 1209 Given 20 Units Subcutaneous (Left Lower Abdomen) Dl Palafox RN      Insulin Regular Human (NOVOLIN R) 100 Units in sodium chloride 0.9% 100 mL infusion     Date Action Dose Route User    2/25/2021 0511 Rate/Dose Ve

## 2021-02-25 NOTE — ED NOTES
Orders for admission, patient is aware of plan and ready to go upstairs. Any questions, please call ED RN Myrtle Schlatter  at extension [phone].    Type of COVID test sent:Rapid - not detected  COVID Suspicion level: Low    Titratable drug(s) infusing:Insulin 100u/100

## 2021-02-25 NOTE — ED PROVIDER NOTES
Patient Seen in: [facility] AND [facility] Emergency Department      History   Patient presents with:  Hyperglycemia    Stated Complaint: Hyperglycemic     HPI/Subjective:   HPI    68-year-old female with history of diabetes, on Metformin and glipizide, hyperten Genitourinary: Positive for frequency. Negative for dysuria. Musculoskeletal: Negative for back pain. Skin: Negative for rash. Neurological: Negative for dizziness. Psychiatric/Behavioral: Negative for suicidal ideas.    All other systems reviewed mmol/L    CO2 26.0 21.0 - 32.0 mmol/L    Anion Gap 6 0 - 18 mmol/L    BUN 20 (H) 7 - 18 mg/dL    Creatinine 1.28 (H) 0.55 - 1.02 mg/dL    BUN/CREA Ratio 15.6 10.0 - 20.0    Calcium, Total 9.0 8.5 - 10.1 mg/dL    Calculated Osmolality 298 (H) 275 - 295 mOsm Emergency Department evaluation.      45yoF with dry mouth, polyuria, hyperglycemia  - I personally reviewed and interpreted all the ED vitals  - afebrile, hemodynamically stable  - I ordered and personally reviewed the labs and imaging and found hyperglyce in 2 days  As needed          Medications Prescribed:  Current Discharge Medication List                          Present on Admission  Date Reviewed: 2/10/2020          ICD-10-CM Noted POA    Hyperglycemia R73.9 2/25/2021 Unknown

## 2021-02-25 NOTE — DIABETES ED
[facility] HOSP - [facility]    Diabetes Education  Note    [facility] Patient Status:  Inpatient   1975 MRN [id number]  Location [facility] [place] [place] 2W/SW Attending Diana Hull MD  Hosp Day # 0 PCP Gardenia Peres, Medical Doctor, MD      Labs:    Hg RN  Diabetes Educator  2/25/2021  11:49 AM

## 2021-02-25 NOTE — ED INITIAL ASSESSMENT (HPI)
PT reports urinary frequency and dry throat, states that her BS reading has been \"high\" at home. Takes metformin and reports she has been compliant with meds.

## 2021-02-25 NOTE — CM/SW NOTE
02/25/21 1100   Discharge disposition   Expected discharge disposition Home or Self   Referrals provided No   Discharge transportation Private car       Per nursing rounds pt will dc home 2/26 with no home care needs.   RN and Diabetes ED to provide diab

## 2021-02-25 NOTE — PLAN OF CARE
Received Clara Lloyd from ED ~0500. A&Ox4. Maintaining sats on room air. Per pt report utilizes mouth guard at night for VAN tx, does not have appliance present in hospital. NSR 60s-80s and BP WNL. Pt denies nausea.  Currently in algorithm 2 with an accucheck caden initiate nutrition consult as needed  - Instruct patient on self management of diabetes  Outcome: Progressing  Goal: Electrolytes maintained within normal limits  Description: INTERVENTIONS:  - Monitor labs and rhythm and assess patient for signs and sympt

## 2021-02-25 NOTE — PLAN OF CARE
Pt stable, insulin infusion on algorithm 3, low-dose, d/c'd after Levemir & lunch dose Novolog given per order MD. Intake good. Monitor w/SR, no ectopy, VSS. Diabetic educator consulted & saw pt, RD consulted to reinforce diabetic diet knowledge.  Pt tired/ ordered  - Monitor response to electrolyte replacements, including rhythm and repeat lab results as appropriate  Outcome: Progressing  Goal: Hemodynamic stability and optimal renal function maintained  Description: INTERVENTIONS:  - Monitor labs and assess

## 2021-02-25 NOTE — H&P
[address] Patient Status:  Inpatient    1975 MRN [id number]   Location [facility] 2W/ Attending Karina Arizmendi MD   Hosp Day # 0 PCP Asif Christopher, Medical Doctor, MD     Date:   Tab, Take 1,000 mg by mouth daily with breakfast.  , Disp: , Rfl: , 2/24/2021 at Unknown time    •  simvastatin 40 MG Oral Tab, Take 40 mg by mouth., Disp: , Rfl: , 2/24/2021 at Unknown time    •  lisinopril 10 MG Oral Tab, , Disp: , Rfl: 3, 2/24/2021 at U tenderness    Laboratory Data:   Lab Results   Component Value Date    WBC 9.0 02/25/2021    HGB 14.3 02/25/2021    HCT 43.4 02/25/2021    .0 02/25/2021    CREATSERUM 1.28 02/25/2021    BUN 20 02/25/2021     02/25/2021    K 4.6 02/25/2021    C

## 2021-02-25 NOTE — PROGRESS NOTES
Pending sale to [facility] Note:  Anticoagulation Weight Dose Adjustment for enoxaparin    Odessa Ta is a 39year old patient who has been prescribed enoxaparin 40 units every 24 hours.       Estimated Creatinine Clearance: 45.9 mL/min (A) (based on SCr of 1.28 mg/dL

## 2021-02-25 NOTE — DIETARY NOTE
NUTRITION NOTE:   Consult received for DM diet education.  Pt is on appropriate diet for DM and weight loss (estimate needs: 3893-4879 for 15-20 kcals/kg current wt to promote weight loss in this pt with moribid obesity BMI >40

## 2021-02-25 NOTE — PROGRESS NOTES
Pt transferred to room 552 by w/c, belongings w/her, pt notified spouse of new room. Report called earlier to Bonnie Toscano RN, no change status. Skin check verified by NICHOLAS Russ, no visible wounds.

## 2021-02-25 NOTE — ED NOTES
Pt c/o elevated BG for the past 3 days. Pt states that she has been taking her meds, but notes some recent diet changes. Pt also notes dry mouth and reported to ERMD that she feels something stuck in throat. NARD. Will continue to monitor.

## 2021-02-26 VITALS
OXYGEN SATURATION: 97 % | DIASTOLIC BLOOD PRESSURE: 52 MMHG | TEMPERATURE: 98 F | BODY MASS INDEX: 40.49 KG/M2 | SYSTOLIC BLOOD PRESSURE: 119 MMHG | WEIGHT: 228.5 LBS | HEIGHT: 63 IN | HEART RATE: 77 BPM | RESPIRATION RATE: 18 BRPM

## 2021-02-26 LAB
ANION GAP SERPL CALC-SCNC: 6 MMOL/L (ref 0–18)
BASOPHILS # BLD AUTO: 0.03 X10(3) UL (ref 0–0.2)
BASOPHILS NFR BLD AUTO: 0.4 %
BUN BLD-MCNC: 10 MG/DL (ref 7–18)
BUN/CREAT SERPL: 11.9 (ref 10–20)
CALCIUM BLD-MCNC: 8.7 MG/DL (ref 8.5–10.1)
CHLORIDE SERPL-SCNC: 107 MMOL/L (ref 98–112)
CO2 SERPL-SCNC: 23 MMOL/L (ref 21–32)
CREAT BLD-MCNC: 0.84 MG/DL
DEPRECATED RDW RBC AUTO: 44.9 FL (ref 35.1–46.3)
EOSINOPHIL # BLD AUTO: 0.15 X10(3) UL (ref 0–0.7)
EOSINOPHIL NFR BLD AUTO: 1.9 %
ERYTHROCYTE [DISTWIDTH] IN BLOOD BY AUTOMATED COUNT: 13.8 % (ref 11–15)
GLUCOSE BLD-MCNC: 313 MG/DL (ref 70–99)
GLUCOSE BLDC GLUCOMTR-MCNC: 276 MG/DL (ref 70–99)
GLUCOSE BLDC GLUCOMTR-MCNC: 319 MG/DL (ref 70–99)
GLUCOSE BLDC GLUCOMTR-MCNC: 326 MG/DL (ref 70–99)
HCT VFR BLD AUTO: 41.9 %
HGB BLD-MCNC: 13.2 G/DL
IMM GRANULOCYTES # BLD AUTO: 0.01 X10(3) UL (ref 0–1)
IMM GRANULOCYTES NFR BLD: 0.1 %
LYMPHOCYTES # BLD AUTO: 2.7 X10(3) UL (ref 1–4)
LYMPHOCYTES NFR BLD AUTO: 34.9 %
MCH RBC QN AUTO: 28 PG (ref 26–34)
MCHC RBC AUTO-ENTMCNC: 31.5 G/DL (ref 31–37)
MCV RBC AUTO: 89 FL
MONOCYTES # BLD AUTO: 0.47 X10(3) UL (ref 0.1–1)
MONOCYTES NFR BLD AUTO: 6.1 %
NEUTROPHILS # BLD AUTO: 4.37 X10 (3) UL (ref 1.5–7.7)
NEUTROPHILS # BLD AUTO: 4.37 X10(3) UL (ref 1.5–7.7)
NEUTROPHILS NFR BLD AUTO: 56.6 %
OSMOLALITY SERPL CALC.SUM OF ELEC: 293 MOSM/KG (ref 275–295)
PLATELET # BLD AUTO: 229 10(3)UL (ref 150–450)
POTASSIUM SERPL-SCNC: 4.3 MMOL/L (ref 3.5–5.1)
RBC # BLD AUTO: 4.71 X10(6)UL
SODIUM SERPL-SCNC: 136 MMOL/L (ref 136–145)
WBC # BLD AUTO: 7.7 X10(3) UL (ref 4–11)

## 2021-02-26 NOTE — DISCHARGE SUMMARY
[facility]    Discharge Summary    Ely Limon Patient Status:  Inpatient    1975 MRN [id number]   Location [facility] 5SW/SE Attending Jeanne Jones MD   Hosp Day # 1 PCP Víctor Villar, Medical Doctor, MD     Date of Admis urgency. Hospital Course:   1. Uncontrolled diabetes mellitus with hyperglycemia, hemoglobin A1c 12.4  2. Hypertension  3. Hyperlipidemia  4. Obesity     Patient was seen and followed by endocrinology service. Started on insulin.   Diabetic teaching

## 2021-02-26 NOTE — CONSULTS
[facility]    Report of Consultation    Binta Rosen Patient Status:  Inpatient    1975 MRN [id number]   Location [facility] 5SW/SE Attending Travis Manriquez MD   Hosp Day # 0 PCP Zeinab Saldivar, Medical Doctor, MD     Date of (TYLENOL) tab 650 mg, 650 mg, Oral, Q6H PRN  •  ondansetron HCl (ZOFRAN) injection 4 mg, 4 mg, Intravenous, Q6H PRN  •  Levothyroxine Sodium (SYNTHROID) tab 50 mcg, 50 mcg, Oral, Before breakfast  •  lisinopril tab 10 mg, 10 mg, Oral, Daily  •  atorvastati difficulty  Psychiatric:  oriented to time, self, and place  Extremities: no obvious extremity swelling, no lesions      Impression and Plan:  Patient Active Problem List:     Dermoid cyst     Vaginal spotting     Morbid obesity due to excess calories ([address]

## 2021-02-26 NOTE — DIABETES ED
[facility]    Diabetes Education  Note    Niyah Manriquez Patient Status:  Inpatient   1975 MRN [id number]  Location [facility] 5SW/SE Attending Maxwell Bateman MD  Hosp Day # 1 PCP fOelia Marie, Medical Doctor, MD      Labs:    Roxy Will

## 2021-02-26 NOTE — PLAN OF CARE
A/Ox4. Fall risk precautions appropriate for pt: be din lowest position, call light within reach, non-skid socks. Pt cleared for DC. DC instructions given-voices understanding.   IV removed by DC RN  Diabetes educator at bedside  Pt educated on medication AND ELECTROLYTES - ADULT  Goal: Glucose maintained within prescribed range  Description: INTERVENTIONS:  - Monitor Blood Glucose as ordered  - Assess for signs and symptoms of hyperglycemia and hypoglycemia  - Administer ordered medications to maintain glu influences on pain and pain management  - Manage/alleviate anxiety  - Utilize distraction and/or relaxation techniques  - Monitor for opioid side effects  - Notify MD/LIP if interventions unsuccessful or patient reports new pain  - Anticipate increased vivian

## 2021-02-26 NOTE — PROGRESS NOTES
Discharge RN Summary: Patient has discharge order in. Patient to discharge home with . IV removed by this RN. Understands to follow up with PCP in 1 week.  Patient understands to  her medications from pharmacy per MD Sow Pod) pt insulin/s

## 2021-02-26 NOTE — PLAN OF CARE
Pt oriented to unit and call light system. VSS. HS blood sugar elevated, endocrinologist notified, Novolog and another accucheck at 0200 ordered. At 0200 blood sugar still elevated, MD paged and additional Novolog ordered.  Pt educated on blood glucose nate electrolyte replacements, including rhythm and repeat lab results as appropriate  - Fluid restriction as ordered  - Instruct patient on fluid and nutrition restrictions as appropriate  Outcome: Progressing  Goal: Hemodynamic stability and optimal renal fun on assessment  - Modify environment to reduce risk of injury  - Provide assistive devices as appropriate  - Consider OT/PT consult to assist with strengthening/mobility  - Encourage toileting schedule  Outcome: Progressing     Problem: DISCHARGE PLANNING initiate nutrition consult as needed  - Instruct patient on self management of diabetes  Outcome: Not Progressing

## 2021-03-01 NOTE — PAYOR COMM NOTE
--------------  DISCHARGE REVIEW    Payor: [facility]  Subscriber #:  [id number]  Authorization Number: [id number]    Admit date: 2/25/21  [facility]  Discharge Date: 2/26/2021  3:06 PM     Admitting Physician: MD Jerrell Funez that she does not use drugs.     Allergies:  No Known Allergies     Medications:     Current Facility-Administered Medications:   •  acetaminophen (TYLENOL) tab 650 mg, 650 mg, Oral, Q6H PRN  •  ondansetron HCl (ZOFRAN) injection 4 mg, 4 mg, Intravenous, Q moisture and skin texture, no visible lesions  Hematologic:  no excessive bruising  Neuro: motor grossly intact, moving all extremities without difficulty  Psychiatric:  oriented to time, self, and place  Extremities: no obvious extremity swelling, no lesi

## 2021-03-03 ENCOUNTER — TELEPHONE (OUTPATIENT)
Dept: ENDOCRINOLOGY CLINIC | Facility: CLINIC | Age: 46
End: 2021-03-03

## 2021-03-03 NOTE — TELEPHONE ENCOUNTER
Prior Authorization for    Admelog SoloStar 100unit/ml Pen-injectors    KEY: [id number]    Please follow up, thank you .

## 2021-03-04 NOTE — TELEPHONE ENCOUNTER
Called pharmacy to confirm. Per [facility] patient picked up humalog on 2/26/21 from [address] [place] [address] in [place]. No further action required at this time.

## 2021-03-08 ENCOUNTER — OFFICE VISIT (OUTPATIENT)
Dept: ENDOCRINOLOGY CLINIC | Facility: CLINIC | Age: 46
End: 2021-03-08
Payer: COMMERCIAL

## 2021-03-08 VITALS
SYSTOLIC BLOOD PRESSURE: 132 MMHG | DIASTOLIC BLOOD PRESSURE: 73 MMHG | HEART RATE: 67 BPM | BODY MASS INDEX: 44 KG/M2 | WEIGHT: 247.38 LBS

## 2021-03-08 DIAGNOSIS — E11.65 UNCONTROLLED TYPE 2 DIABETES MELLITUS WITH HYPERGLYCEMIA (HCC): Primary | ICD-10-CM

## 2021-03-08 PROCEDURE — 3078F DIAST BP <80 MM HG: CPT | Performed by: NURSE PRACTITIONER

## 2021-03-08 PROCEDURE — 99204 OFFICE O/P NEW MOD 45 MIN: CPT | Performed by: NURSE PRACTITIONER

## 2021-03-08 PROCEDURE — 3075F SYST BP GE 130 - 139MM HG: CPT | Performed by: NURSE PRACTITIONER

## 2021-03-08 RX ORDER — SEMAGLUTIDE 1.34 MG/ML
0.25 INJECTION, SOLUTION SUBCUTANEOUS WEEKLY
Qty: 3 PEN | Refills: 0 | Status: SHIPPED | OUTPATIENT
Start: 2021-03-08 | End: 2021-04-07

## 2021-03-08 NOTE — PROGRESS NOTES
Name: Aishwarya Edwards  Date: 3/8/2021    Referring Physician: No ref. provider found    CHIEF COMPLAINT   No chief complaint on file.       HISTORY OF PRESENT ILLNESS   Aishwarya Edwards is a 39year old female who presents for follow up on diabetes management s/ hospitalized   Has been avoiding sweets, chips and regular soda since hospitalization     EXERCISE:   Yes- walks daily at work- [employer]     Polyuria, polyphagia, polydipsia: no   Paresthesias: no   Blurred vision: no   Recent steroids, illness before breakfast.  , Disp: , Rfl: 5    ALLERGIES:   No Known Allergies    SOCIAL HISTORY:   Social History    Socioeconomic History      Marital status:       Spouse name: Not on file      Number of children: Not on file      Years of education: Not Performed by Mich Chadwick MD at [facility]:    03/08/21  1557   BP: 132/73   BP Location: Right arm   Patient Position: Sitting   Cuff Size: large   Pulse: 67   Weight: 247 lb 6.4 oz (112.2 kg)     BMI:   Body mass index is 43.82 to at least 150 mins per week.    -reviewed target goal BG readings and A1C  -reviewed when to call clinic with abnormal BG readings.   -Instructed patient to call or send [address] [place] [address] with BG readings after 4 weeks of starting ozempic, sooner if she develops BG

## 2021-03-08 NOTE — PROGRESS NOTES
Name: Sergio Oreilly  Date: 3/8/2021    Referring Physician: No ref. provider found    CHIEF COMPLAINT   No chief complaint on file.       HISTORY OF PRESENT ILLNESS   Sergio Oreilly is a 39year old female who presents for follow up on diabetes management s/ cough, or dyspnea.   Cardiovascular: Negative for:  chest pain, chest discomfort, palpitations  GI: Negative for:  abdominal pain, nausea, vomiting, diarrhea, heartburn, constipation  Neurology: Negative for: headache, dizziness, syncope, numbness/tingling, Resource Strain:       Difficulty of Paying Living Expenses:   Food Insecurity:       Worried About [address] in the Last Year:       Ran Out of Food in the Last Year:   Transportation Needs:       Lack of Transportation (Medical):       Lack of intact and motor grossly intact  Psychiatric:  oriented to time, self, and place  Nutritional:  no abnormal weight gain or loss     DIABETIC FOOT EXAM:  Skin to bilateral feet- intact without any skin discoloration.   Bilateral 2+ pedal pulse pedal pulse ex plan.      3/8/2021  Digna Lyons, APRN

## 2021-03-09 ENCOUNTER — TELEPHONE (OUTPATIENT)
Dept: ENDOCRINOLOGY CLINIC | Facility: CLINIC | Age: 46
End: 2021-03-09

## 2021-03-09 NOTE — TELEPHONE ENCOUNTER
Prior Authorization Request for;      •  Semaglutide,0.25 or 0.5MG/DOS, (OZEMPIC, 0.25 OR 0.5 MG/DOSE,) 2 MG/1.5ML Subcutaneous Solution Pen-injector, Inject 0.25 mg into the skin once a week., Disp: 3 pen, Rfl: 0    KEY: [id number]    Please follow up, than

## 2021-03-10 NOTE — TELEPHONE ENCOUNTER
PA pending. Medication PA Requested: Ozempic 0.25 mg                                                           CoverMyMeds Used: yes  Key: [id number]  Sig: Inject 0.25 mg weekly  DX Code: E11.9      Sent to plan with endo consult note and A1c of  2/25/21.

## 2021-03-10 NOTE — TELEPHONE ENCOUNTER
Medication PA Requested: Ozempic 0.25 mg                                                           CoverMyMeds Used: yes  Key: [id number]  Sig: Inject 0.25 mg weekly  DX Code: E11.9

## 2021-03-15 NOTE — TELEPHONE ENCOUNTER
PA not required.    Medication PA Requested: Ozempic 0.25 mg                                                           CoverMyMeds Used: yes  Key: [id number]  Sig: Inject 0.25 mg weekly  DX Code: E11.9    Phoned [place] [phone], spoke with Alireza Frausto [facility] pt

## 2021-03-18 DIAGNOSIS — Z23 NEED FOR VACCINATION: ICD-10-CM

## 2021-04-06 ENCOUNTER — PATIENT MESSAGE (OUTPATIENT)
Dept: ENDOCRINOLOGY CLINIC | Facility: CLINIC | Age: 46
End: 2021-04-06

## 2021-04-07 ENCOUNTER — OFFICE VISIT (OUTPATIENT)
Dept: ENDOCRINOLOGY CLINIC | Facility: CLINIC | Age: 46
End: 2021-04-07
Payer: COMMERCIAL

## 2021-04-07 VITALS
HEART RATE: 66 BPM | RESPIRATION RATE: 18 BRPM | HEIGHT: 63 IN | SYSTOLIC BLOOD PRESSURE: 137 MMHG | WEIGHT: 243 LBS | DIASTOLIC BLOOD PRESSURE: 92 MMHG | BODY MASS INDEX: 43.05 KG/M2

## 2021-04-07 DIAGNOSIS — E11.65 UNCONTROLLED TYPE 2 DIABETES MELLITUS WITH HYPERGLYCEMIA (HCC): Primary | ICD-10-CM

## 2021-04-07 DIAGNOSIS — I10 ESSENTIAL HYPERTENSION: ICD-10-CM

## 2021-04-07 DIAGNOSIS — E78.00 PURE HYPERCHOLESTEROLEMIA: ICD-10-CM

## 2021-04-07 PROCEDURE — 3075F SYST BP GE 130 - 139MM HG: CPT | Performed by: INTERNAL MEDICINE

## 2021-04-07 PROCEDURE — 3008F BODY MASS INDEX DOCD: CPT | Performed by: INTERNAL MEDICINE

## 2021-04-07 PROCEDURE — 3080F DIAST BP >= 90 MM HG: CPT | Performed by: INTERNAL MEDICINE

## 2021-04-07 PROCEDURE — 82947 ASSAY GLUCOSE BLOOD QUANT: CPT | Performed by: INTERNAL MEDICINE

## 2021-04-07 PROCEDURE — 36416 COLLJ CAPILLARY BLOOD SPEC: CPT | Performed by: INTERNAL MEDICINE

## 2021-04-07 PROCEDURE — 99214 OFFICE O/P EST MOD 30 MIN: CPT | Performed by: INTERNAL MEDICINE

## 2021-04-07 RX ORDER — INSULIN LISPRO 100 [IU]/ML
8 INJECTION, SOLUTION INTRAVENOUS; SUBCUTANEOUS 3 TIMES DAILY
COMMUNITY
Start: 2021-04-05 | End: 2021-07-14

## 2021-04-07 RX ORDER — SEMAGLUTIDE 1.34 MG/ML
0.5 INJECTION, SOLUTION SUBCUTANEOUS WEEKLY
Qty: 4.5 ML | Refills: 0 | Status: SHIPPED | OUTPATIENT
Start: 2021-04-07 | End: 2021-06-11

## 2021-04-07 RX ORDER — INSULIN GLARGINE 100 [IU]/ML
20 INJECTION, SOLUTION SUBCUTANEOUS DAILY
COMMUNITY
Start: 2021-02-26 | End: 2021-07-14

## 2021-04-07 NOTE — PROGRESS NOTES
Name: Tyrone Lawson  Date: 4/7/2021    Referring Physician: Katharine Colvin is a 39year old female who presents for follow-up of evaluation and management of uncontrolled Type 2 Diabetes.  At her last visit, s Inject 0.5 mg into the skin once a week., Disp: 4.5 mL, Rfl: 0  •  metFORMIN HCl 1000 MG Oral Tab, Take 1 tablet (1,000 mg total) by mouth 2 (two) times daily with meals. , Disp: 180 tablet, Rfl: 0  •  simvastatin 40 MG Oral Tab, Take 40 mg by mouth., Disp: pupils  Ears/Nose/Mouth/Throat/Neck:  no palpable thyroid nodules or cervical lymphadenopathy  Neck: Trachea midline: Normal  Psychiatric:  oriented to time, self, and place  Nutritional:  no abnormal weight gain or loss    Lab Data:   Lab Results   Compon Complications- We discussed the complications of diabetes include retinopathy, neuropathy, nephropathy and cardiovascular disease.   - She does not have neuropathy, CAD  - She is due for MAC, lipid panel, CMP- she will get these before the next visit  - Sh

## 2021-04-07 NOTE — PATIENT INSTRUCTIONS
Please decrease Lantus to 20 units at night   Decrease Humalog to 8 units tid with meals    Call our office after two weeks with blood sugars  Call office with blood sugars <70 or >300

## 2021-04-18 RX ORDER — PEN NEEDLE, DIABETIC 32GX 5/32"
NEEDLE, DISPOSABLE MISCELLANEOUS
Qty: 400 EACH | Refills: 0 | Status: SHIPPED | OUTPATIENT
Start: 2021-04-18 | End: 2021-06-30

## 2021-04-22 ENCOUNTER — PATIENT MESSAGE (OUTPATIENT)
Dept: ENDOCRINOLOGY CLINIC | Facility: CLINIC | Age: 46
End: 2021-04-22

## 2021-04-23 NOTE — TELEPHONE ENCOUNTER
From: Joel Pringle  To: Antonia Carvajal MD  Sent: 4/22/2021 8:16 PM CDT  Subject: Other    2 weeks sugar results

## 2021-04-25 ENCOUNTER — LAB ENCOUNTER (OUTPATIENT)
Dept: LAB | Facility: HOSPITAL | Age: 46
End: 2021-04-25
Attending: INTERNAL MEDICINE
Payer: COMMERCIAL

## 2021-04-26 NOTE — TELEPHONE ENCOUNTER
Hi!  Please let her know that her blood sugars look very good and she should continue on the same doses and send us blood sugars in two more weeks. Thank you!

## 2021-04-29 RX ORDER — BLOOD SUGAR DIAGNOSTIC
1 STRIP MISCELLANEOUS 3 TIMES DAILY
Qty: 300 EACH | Refills: 0 | Status: SHIPPED | OUTPATIENT
Start: 2021-04-29

## 2021-04-29 RX ORDER — LANCETS 33 GAUGE
1 EACH MISCELLANEOUS 3 TIMES DAILY
Qty: 300 EACH | Refills: 0 | Status: SHIPPED | OUTPATIENT
Start: 2021-04-29 | End: 2021-06-30

## 2021-04-29 NOTE — TELEPHONE ENCOUNTER
Pharmacy refill request for:      One Touch Delica Plus Lancet 30N  Test blood glucose 3 times daily. QTY: 100    One Touch Ultra In Vitro Strips  Test blood glucose 3 times daily. QTY: 100    Please follow up, thank you.

## 2021-04-30 ENCOUNTER — TELEPHONE (OUTPATIENT)
Dept: ENDOCRINOLOGY CLINIC | Facility: CLINIC | Age: 46
End: 2021-04-30

## 2021-04-30 RX ORDER — BLOOD-GLUCOSE METER
KIT MISCELLANEOUS
Qty: 1 KIT | Refills: 0 | Status: SHIPPED | OUTPATIENT
Start: 2021-04-30

## 2021-04-30 RX ORDER — LANCETS 28 GAUGE
EACH MISCELLANEOUS
Qty: 300 EACH | Refills: 0 | Status: SHIPPED | OUTPATIENT
Start: 2021-04-30

## 2021-04-30 RX ORDER — BLOOD-GLUCOSE METER
KIT MISCELLANEOUS
Qty: 300 STRIP | Refills: 0 | Status: SHIPPED | OUTPATIENT
Start: 2021-04-30

## 2021-04-30 NOTE — TELEPHONE ENCOUNTER
Pharmacy states pt's insurance does not cover   Glucose Blood (ONETOUCH ULTRA) In Vitro Strip 300 each 0 2021    Si each by Other route 3 (three) times daily.  Use to check blood sugars three times daily     Route:   Other     Order #:   [id number]

## 2021-04-30 NOTE — TELEPHONE ENCOUNTER
Freestyle freedom lite machine, strips, lancets, sent to the pharmacy per written order since provider sent onetouch brand yesterday.

## 2021-05-23 ENCOUNTER — LAB ENCOUNTER (OUTPATIENT)
Dept: LAB | Facility: HOSPITAL | Age: 46
End: 2021-05-23
Attending: INTERNAL MEDICINE
Payer: COMMERCIAL

## 2021-05-23 DIAGNOSIS — E11.65 UNCONTROLLED TYPE 2 DIABETES MELLITUS WITH HYPERGLYCEMIA (HCC): ICD-10-CM

## 2021-05-23 PROCEDURE — 82043 UR ALBUMIN QUANTITATIVE: CPT

## 2021-05-23 PROCEDURE — 36415 COLL VENOUS BLD VENIPUNCTURE: CPT

## 2021-05-23 PROCEDURE — 82570 ASSAY OF URINE CREATININE: CPT

## 2021-05-23 PROCEDURE — 3061F NEG MICROALBUMINURIA REV: CPT | Performed by: INTERNAL MEDICINE

## 2021-05-23 PROCEDURE — 80061 LIPID PANEL: CPT

## 2021-05-23 PROCEDURE — 80053 COMPREHEN METABOLIC PANEL: CPT

## 2021-06-02 ENCOUNTER — OFFICE VISIT (OUTPATIENT)
Dept: ENDOCRINOLOGY CLINIC | Facility: CLINIC | Age: 46
End: 2021-06-02
Payer: COMMERCIAL

## 2021-06-02 VITALS
HEIGHT: 63 IN | HEART RATE: 70 BPM | BODY MASS INDEX: 42.35 KG/M2 | WEIGHT: 239 LBS | DIASTOLIC BLOOD PRESSURE: 80 MMHG | SYSTOLIC BLOOD PRESSURE: 123 MMHG

## 2021-06-02 DIAGNOSIS — E11.65 UNCONTROLLED TYPE 2 DIABETES MELLITUS WITH HYPERGLYCEMIA (HCC): Primary | ICD-10-CM

## 2021-06-02 DIAGNOSIS — I10 ESSENTIAL HYPERTENSION: ICD-10-CM

## 2021-06-02 DIAGNOSIS — E78.00 PURE HYPERCHOLESTEROLEMIA: ICD-10-CM

## 2021-06-02 PROCEDURE — 83036 HEMOGLOBIN GLYCOSYLATED A1C: CPT | Performed by: INTERNAL MEDICINE

## 2021-06-02 PROCEDURE — 3008F BODY MASS INDEX DOCD: CPT | Performed by: INTERNAL MEDICINE

## 2021-06-02 PROCEDURE — 3074F SYST BP LT 130 MM HG: CPT | Performed by: INTERNAL MEDICINE

## 2021-06-02 PROCEDURE — 82947 ASSAY GLUCOSE BLOOD QUANT: CPT | Performed by: INTERNAL MEDICINE

## 2021-06-02 PROCEDURE — 99214 OFFICE O/P EST MOD 30 MIN: CPT | Performed by: INTERNAL MEDICINE

## 2021-06-02 PROCEDURE — 36416 COLLJ CAPILLARY BLOOD SPEC: CPT | Performed by: INTERNAL MEDICINE

## 2021-06-02 PROCEDURE — 3079F DIAST BP 80-89 MM HG: CPT | Performed by: INTERNAL MEDICINE

## 2021-06-02 NOTE — PROGRESS NOTES
Name: Binta Rosen  Date: 6/02/21    Referring Physician: Mone Colvin is a 39year old female who presents for follow-up of evaluation and management of uncontrolled Type 2 Diabetes.  At her last visit, he blood sugar 3x/day, Disp: 300 each, Rfl: 0  •  DROPLET PEN NEEDLES 32G X 4 MM Does not apply Misc, USE 4 TIMES DAILY, Disp: 400 each, Rfl: 0  •  HUMALOG KWIKPEN 100 UNIT/ML Subcutaneous Solution Pen-injector, 8 Units 3 (three) times daily.   , Disp: , Rfl: cholesterol    • Morbid obesity with BMI of 40.0-44.9, adult (HCC)    • Obesity    • Sleep apnea     mouthguard   • Visual impairment     glasses       Surgical history:   Past Surgical History:   Procedure Laterality Date   • CHOLECYSTECTOMY           PHY the importance of SBGM. She is doing very well on the Ozempic, metformin and the insulin. Judging by her blood sugars, we should definitely decrease her doses.    - Increase Ozempic to 1.0mg qweekly, continue the metformin  - Decrease lantus from 20 to 10 u

## 2021-06-03 RX ORDER — EMPAGLIFLOZIN 25 MG/1
25 TABLET, FILM COATED ORAL DAILY
Qty: 90 TABLET | Refills: 0 | Status: SHIPPED | OUTPATIENT
Start: 2021-06-03 | End: 2021-09-19

## 2021-06-30 RX ORDER — LANCETS 28 GAUGE
EACH MISCELLANEOUS
Qty: 300 EACH | Refills: 0 | Status: SHIPPED | OUTPATIENT
Start: 2021-06-30

## 2021-06-30 RX ORDER — PEN NEEDLE, DIABETIC 32GX 5/32"
NEEDLE, DISPOSABLE MISCELLANEOUS
Qty: 400 EACH | Refills: 0 | Status: SHIPPED | OUTPATIENT
Start: 2021-06-30

## 2021-07-13 NOTE — TELEPHONE ENCOUNTER
Letter created and sent to patient via [employer], copy has also been printed if patient would like to pick it up at the office. Awaiting response.

## 2021-07-13 NOTE — TELEPHONE ENCOUNTER
Pt is calling to receive a letter from the doctor to travel with her insulin and needles in her carry on for her flight. Pt will be traveling on Saturday would like to  the letter this week.

## 2021-07-16 ENCOUNTER — TELEPHONE (OUTPATIENT)
Dept: ENDOCRINOLOGY CLINIC | Facility: CLINIC | Age: 46
End: 2021-07-16

## 2021-07-16 RX ORDER — INSULIN GLARGINE 100 [IU]/ML
10 INJECTION, SOLUTION SUBCUTANEOUS NIGHTLY
Qty: 9 ML | Refills: 0 | Status: SHIPPED | OUTPATIENT
Start: 2021-07-16

## 2021-07-16 RX ORDER — INSULIN LISPRO 100 [IU]/ML
5 INJECTION, SOLUTION INTRAVENOUS; SUBCUTANEOUS
Qty: 15 ML | Refills: 0 | Status: SHIPPED | OUTPATIENT
Start: 2021-07-16

## 2021-07-16 NOTE — TELEPHONE ENCOUNTER
Medication PA Requested: insulin glargine (LANTUS SOLOSTAR) 100 UNIT/ML Subcutaneous Solution Pen-injector, Disp: 9 mL, Rfl: 0                                                         CoverMyMeds Used:  Yes  Key: [id number]  Sig: Inject 10 Units into the skin

## 2021-07-16 NOTE — TELEPHONE ENCOUNTER
Prior Authorization request for:    •  insulin glargine (LANTUS SOLOSTAR) 100 UNIT/ML Subcutaneous Solution Pen-injector, Inject 10 Units into the skin nightly., Disp: 9 mL, Rfl: 0    KEY: [facility]

## 2021-07-22 NOTE — TELEPHONE ENCOUNTER
Dr. Corbin Khalil,     Please see below denial of lantus. Per plan, patient must try and fail on at least 3 preferred alternatives unless there's a medical reason why she can't. Preferred alt are levemir. Anjelica Bliss.   Looks like patient was on levemi

## 2021-07-23 NOTE — TELEPHONE ENCOUNTER
Spoke to patient who stated that she was already able to  the [address], although not evident in her chart that new prescription was sent. Advised patient to call us when she is running low on insulin, and we will send in another prescription.

## 2021-09-07 RX ORDER — SEMAGLUTIDE 1.34 MG/ML
1 INJECTION, SOLUTION SUBCUTANEOUS
Qty: 9 ML | Refills: 0 | Status: SHIPPED | OUTPATIENT
Start: 2021-09-07 | End: 2021-09-08

## 2021-09-08 ENCOUNTER — OFFICE VISIT (OUTPATIENT)
Dept: ENDOCRINOLOGY CLINIC | Facility: CLINIC | Age: 46
End: 2021-09-08
Payer: COMMERCIAL

## 2021-09-08 VITALS
WEIGHT: 228 LBS | RESPIRATION RATE: 16 BRPM | HEART RATE: 64 BPM | DIASTOLIC BLOOD PRESSURE: 82 MMHG | SYSTOLIC BLOOD PRESSURE: 123 MMHG | BODY MASS INDEX: 40.4 KG/M2 | HEIGHT: 63 IN

## 2021-09-08 DIAGNOSIS — E78.00 PURE HYPERCHOLESTEROLEMIA: ICD-10-CM

## 2021-09-08 DIAGNOSIS — E66.01 CLASS 3 SEVERE OBESITY DUE TO EXCESS CALORIES WITH SERIOUS COMORBIDITY AND BODY MASS INDEX (BMI) OF 40.0 TO 44.9 IN ADULT (HCC): ICD-10-CM

## 2021-09-08 DIAGNOSIS — E03.9 HYPOTHYROIDISM, UNSPECIFIED TYPE: ICD-10-CM

## 2021-09-08 DIAGNOSIS — E11.65 UNCONTROLLED TYPE 2 DIABETES MELLITUS WITH HYPERGLYCEMIA (HCC): Primary | ICD-10-CM

## 2021-09-08 LAB
CARTRIDGE LOT#: ABNORMAL NUMERIC
GLUCOSE BLOOD: 97
HEMOGLOBIN A1C: 5.9 % (ref 4.3–5.6)
TEST STRIP LOT #: NORMAL NUMERIC

## 2021-09-08 PROCEDURE — 83036 HEMOGLOBIN GLYCOSYLATED A1C: CPT | Performed by: INTERNAL MEDICINE

## 2021-09-08 PROCEDURE — 82947 ASSAY GLUCOSE BLOOD QUANT: CPT | Performed by: INTERNAL MEDICINE

## 2021-09-08 PROCEDURE — 99214 OFFICE O/P EST MOD 30 MIN: CPT | Performed by: INTERNAL MEDICINE

## 2021-09-08 PROCEDURE — 3044F HG A1C LEVEL LT 7.0%: CPT | Performed by: INTERNAL MEDICINE

## 2021-09-08 PROCEDURE — 3079F DIAST BP 80-89 MM HG: CPT | Performed by: INTERNAL MEDICINE

## 2021-09-08 PROCEDURE — 3074F SYST BP LT 130 MM HG: CPT | Performed by: INTERNAL MEDICINE

## 2021-09-08 PROCEDURE — 36416 COLLJ CAPILLARY BLOOD SPEC: CPT | Performed by: INTERNAL MEDICINE

## 2021-09-08 PROCEDURE — 3008F BODY MASS INDEX DOCD: CPT | Performed by: INTERNAL MEDICINE

## 2021-09-08 RX ORDER — SEMAGLUTIDE 1.34 MG/ML
1 INJECTION, SOLUTION SUBCUTANEOUS WEEKLY
Qty: 9 ML | Refills: 0 | Status: SHIPPED | OUTPATIENT
Start: 2021-09-08 | End: 2021-12-07

## 2021-09-08 NOTE — PROGRESS NOTES
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  At her last visit, he Subcutaneous Solution Pen-injector, Inject 10 Units into the skin nightly. (Patient taking differently: Inject 8 Units into the skin daily.  Basaglar ), Disp: 9 mL, Rfl: 0  •  Insulin Lispro, 1 Unit Dial, (HUMALOG KWIKPEN) 100 UNIT/ML Subcutaneous Solution Never used    Alcohol use: Yes    Drug use: No      Medical History:   Past Medical History:   Diagnosis Date   • Diabetes ([facility]    • Disorder of thyroid    • High blood pressure    • High cholesterol    • Morbid obesity with BMI of 40.0-44.9, adult ([facility] woman here for evaluation and management of uncontrolled type 2 diabetes. We discussed the ABCs of DM.     1.) Hyperglycemia Management- We discussed the importance of glycemic control to prevent complications of diabetes.  We discussed the importance of SB counseling, anticipatory guidance, and coordination of care. Patient concerns were answered to the best of my knowledge. 9/08/21  Antonia Khalil MD

## 2021-09-15 DIAGNOSIS — E11.65 UNCONTROLLED TYPE 2 DIABETES MELLITUS WITH HYPERGLYCEMIA (HCC): ICD-10-CM

## 2021-09-19 RX ORDER — EMPAGLIFLOZIN 25 MG/1
TABLET, FILM COATED ORAL
Qty: 90 TABLET | Refills: 0 | Status: SHIPPED | OUTPATIENT
Start: 2021-09-19

## 2021-12-05 ENCOUNTER — HOSPITAL ENCOUNTER (EMERGENCY)
Facility: HOSPITAL | Age: 46
Discharge: HOME | End: 2021-12-05
Payer: COMMERCIAL

## 2021-12-05 VITALS
SYSTOLIC BLOOD PRESSURE: 127 MMHG | HEIGHT: 63 IN | RESPIRATION RATE: 18 BRPM | TEMPERATURE: 98 F | BODY MASS INDEX: 40.75 KG/M2 | DIASTOLIC BLOOD PRESSURE: 88 MMHG | HEART RATE: 72 BPM | OXYGEN SATURATION: 99 % | WEIGHT: 230 LBS

## 2021-12-05 DIAGNOSIS — M62.830 SPASM OF MUSCLE OF LOWER BACK: Primary | ICD-10-CM

## 2021-12-05 PROCEDURE — 99283 EMERGENCY DEPT VISIT LOW MDM: CPT

## 2021-12-05 PROCEDURE — 36415 COLL VENOUS BLD VENIPUNCTURE: CPT

## 2021-12-05 RX ORDER — DIAZEPAM 5 MG/1
5 TABLET ORAL ONCE
Status: COMPLETED | OUTPATIENT
Start: 2021-12-05 | End: 2021-12-05

## 2021-12-05 RX ORDER — KETOROLAC TROMETHAMINE 15 MG/ML
15 INJECTION, SOLUTION INTRAMUSCULAR; INTRAVENOUS ONCE
Status: COMPLETED | OUTPATIENT
Start: 2021-12-05 | End: 2021-12-05

## 2021-12-05 RX ORDER — DIAZEPAM 5 MG/1
5 TABLET ORAL 3 TIMES DAILY PRN
Qty: 10 TABLET | Refills: 0 | Status: SHIPPED | OUTPATIENT
Start: 2021-12-05 | End: 2021-12-12

## 2021-12-05 NOTE — ED NOTES
Pt reports feeling better and pain is reduced    Pt verbalized understanding of discharge and follow up instructions, along with prescriptions.  Denies additional questioning  Stable upon discharge

## 2021-12-05 NOTE — ED INITIAL ASSESSMENT (HPI)
Patient complains of R lower back pain since yesterday, denies injury, states it is migratory to her R groin

## 2021-12-05 NOTE — ED PROVIDER NOTES
Patient Seen in: [facility] AND [facility] Emergency Department      History   Patient presents with:  Back Pain    Stated Complaint: lower back pain    Subjective:   59-year-old female with past medical history of type 2 diabetes presenting with right lower morgan 73   Resp 16   Temp 97.8 °F (36.6 °C)   Temp src Oral   SpO2 100 %   O2 Device None (Room air)       Current:/88   Pulse 72   Temp 97.8 °F (36.6 °C) (Oral)   Resp 18   Ht 160 cm (5' 3\")   Wt 104.3 kg   LMP 02/06/2021   SpO2 99%   BMI 40.74 kg/m² diazepam.  Symptoms most consistent with a lumbar strain versus spasm versus other. No radicular signs or symptoms on exam.  No red flag signs or symptoms. Will discharge home with Rx for diazepam and supportive measures.   Patient verbalized understandin

## 2021-12-05 NOTE — ED NOTES
Received pt a/ox4, clear speech, nad, no resp distress, ambulatory with steady gait  Here with c/o atraumatic R lower back pain radiating to R groin  Denies fall, injury or trauma.  Denies incontinence or numbness or tingling to extremities    meds given, s

## 2021-12-08 ENCOUNTER — OFFICE VISIT (OUTPATIENT)
Dept: ENDOCRINOLOGY CLINIC | Facility: CLINIC | Age: 46
End: 2021-12-08
Payer: COMMERCIAL

## 2021-12-08 VITALS
DIASTOLIC BLOOD PRESSURE: 85 MMHG | BODY MASS INDEX: 40.75 KG/M2 | SYSTOLIC BLOOD PRESSURE: 130 MMHG | HEART RATE: 72 BPM | HEIGHT: 63 IN | WEIGHT: 230 LBS

## 2021-12-08 DIAGNOSIS — E03.9 HYPOTHYROIDISM, UNSPECIFIED TYPE: ICD-10-CM

## 2021-12-08 DIAGNOSIS — E66.01 CLASS 3 SEVERE OBESITY DUE TO EXCESS CALORIES WITH SERIOUS COMORBIDITY AND BODY MASS INDEX (BMI) OF 40.0 TO 44.9 IN ADULT (HCC): ICD-10-CM

## 2021-12-08 DIAGNOSIS — E78.00 PURE HYPERCHOLESTEROLEMIA: ICD-10-CM

## 2021-12-08 DIAGNOSIS — E11.65 UNCONTROLLED TYPE 2 DIABETES MELLITUS WITH HYPERGLYCEMIA (HCC): Primary | ICD-10-CM

## 2021-12-08 PROCEDURE — 36416 COLLJ CAPILLARY BLOOD SPEC: CPT | Performed by: INTERNAL MEDICINE

## 2021-12-08 PROCEDURE — 82947 ASSAY GLUCOSE BLOOD QUANT: CPT | Performed by: INTERNAL MEDICINE

## 2021-12-08 PROCEDURE — 83036 HEMOGLOBIN GLYCOSYLATED A1C: CPT | Performed by: INTERNAL MEDICINE

## 2021-12-08 PROCEDURE — 99214 OFFICE O/P EST MOD 30 MIN: CPT | Performed by: INTERNAL MEDICINE

## 2021-12-08 PROCEDURE — 3079F DIAST BP 80-89 MM HG: CPT | Performed by: INTERNAL MEDICINE

## 2021-12-08 PROCEDURE — 3008F BODY MASS INDEX DOCD: CPT | Performed by: INTERNAL MEDICINE

## 2021-12-08 PROCEDURE — 3075F SYST BP GE 130 - 139MM HG: CPT | Performed by: INTERNAL MEDICINE

## 2021-12-08 RX ORDER — SEMAGLUTIDE 1.34 MG/ML
1 INJECTION, SOLUTION SUBCUTANEOUS WEEKLY
Qty: 9 ML | Refills: 1 | Status: SHIPPED | OUTPATIENT
Start: 2021-12-08 | End: 2022-03-08

## 2021-12-08 RX ORDER — SEMAGLUTIDE 1.34 MG/ML
1 INJECTION, SOLUTION SUBCUTANEOUS WEEKLY
COMMUNITY
End: 2021-12-08

## 2021-12-08 NOTE — PROGRESS NOTES
Name: Randy Haywood  Date: 12/08/21    Referring Physician: Dr. 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  At her last visit, h Oral Tab, Take 1 tablet (5 mg total) by mouth 3 (three) times daily as needed for Anxiety. , Disp: 10 tablet, Rfl: 0  •  DROPLET PEN NEEDLES 32G X 4 MM Does not apply Misc, USE 4 TIMES DAILY, Disp: 400 each, Rfl: 0  •  FREESTYLE LANCETS Does not apply Misc, Known Allergies    Social History:   Social History    Tobacco Use      Smoking status: Never Smoker      Smokeless tobacco: Never Used    Vaping Use      Vaping Use: Never used    Alcohol use: Yes    Drug use: No      Medical History:   Past Medical Histo Lab Results   Component Value Date    MALBP 1.22 05/23/2021    CREUR 242.00 05/23/2021         ASSESSMENT/PLAN:    This is a 39year-old woman here for evaluation and management of uncontrolled type 2 diabetes.  We discussed the ABCs of DM.     1.) Hype guidance, and coordination of care. Patient concerns were answered to the best of my knowledge. 12/08/21  Antonia Carvajal MD

## 2022-06-07 ENCOUNTER — TELEPHONE (OUTPATIENT)
Dept: ENDOCRINOLOGY CLINIC | Facility: CLINIC | Age: 47
End: 2022-06-07

## 2022-06-07 DIAGNOSIS — E11.65 UNCONTROLLED TYPE 2 DIABETES MELLITUS WITH HYPERGLYCEMIA (HCC): ICD-10-CM

## 2022-06-07 DIAGNOSIS — E66.01 CLASS 3 SEVERE OBESITY DUE TO EXCESS CALORIES WITH SERIOUS COMORBIDITY AND BODY MASS INDEX (BMI) OF 40.0 TO 44.9 IN ADULT (HCC): ICD-10-CM

## 2022-06-08 RX ORDER — SEMAGLUTIDE 1.34 MG/ML
1 INJECTION, SOLUTION SUBCUTANEOUS WEEKLY
Qty: 9 ML | Refills: 0 | Status: SHIPPED | OUTPATIENT
Start: 2022-06-08 | End: 2022-09-06

## 2022-06-09 NOTE — TELEPHONE ENCOUNTER
Prescription doesn't need PA--out of stock at pharmacy, will be in stock tomorrow and pharmacy will let patient know once processed.

## 2022-06-29 ENCOUNTER — OFFICE VISIT (OUTPATIENT)
Dept: ENDOCRINOLOGY CLINIC | Facility: CLINIC | Age: 47
End: 2022-06-29
Payer: COMMERCIAL

## 2022-06-29 VITALS
SYSTOLIC BLOOD PRESSURE: 149 MMHG | HEART RATE: 69 BPM | WEIGHT: 228 LBS | HEIGHT: 63 IN | DIASTOLIC BLOOD PRESSURE: 89 MMHG | BODY MASS INDEX: 40.4 KG/M2

## 2022-06-29 DIAGNOSIS — I10 ESSENTIAL HYPERTENSION: ICD-10-CM

## 2022-06-29 DIAGNOSIS — E11.65 UNCONTROLLED TYPE 2 DIABETES MELLITUS WITH HYPERGLYCEMIA (HCC): Primary | ICD-10-CM

## 2022-06-29 DIAGNOSIS — E66.01 CLASS 3 SEVERE OBESITY DUE TO EXCESS CALORIES WITH SERIOUS COMORBIDITY AND BODY MASS INDEX (BMI) OF 40.0 TO 44.9 IN ADULT (HCC): ICD-10-CM

## 2022-06-29 DIAGNOSIS — E78.00 PURE HYPERCHOLESTEROLEMIA: ICD-10-CM

## 2022-06-29 DIAGNOSIS — E03.9 HYPOTHYROIDISM, UNSPECIFIED TYPE: ICD-10-CM

## 2022-06-29 LAB
CARTRIDGE LOT#: ABNORMAL NUMERIC
GLUCOSE BLOOD: 130
HEMOGLOBIN A1C: 6.3 % (ref 4.3–5.6)
TEST STRIP LOT #: NORMAL NUMERIC

## 2022-06-29 PROCEDURE — 3079F DIAST BP 80-89 MM HG: CPT | Performed by: INTERNAL MEDICINE

## 2022-06-29 PROCEDURE — 3077F SYST BP >= 140 MM HG: CPT | Performed by: INTERNAL MEDICINE

## 2022-06-29 PROCEDURE — 99214 OFFICE O/P EST MOD 30 MIN: CPT | Performed by: INTERNAL MEDICINE

## 2022-06-29 PROCEDURE — 82947 ASSAY GLUCOSE BLOOD QUANT: CPT | Performed by: INTERNAL MEDICINE

## 2022-06-29 PROCEDURE — 3008F BODY MASS INDEX DOCD: CPT | Performed by: INTERNAL MEDICINE

## 2022-06-29 PROCEDURE — 3044F HG A1C LEVEL LT 7.0%: CPT | Performed by: INTERNAL MEDICINE

## 2022-06-29 PROCEDURE — 83036 HEMOGLOBIN GLYCOSYLATED A1C: CPT | Performed by: INTERNAL MEDICINE

## 2022-06-29 RX ORDER — SEMAGLUTIDE 2.68 MG/ML
2 INJECTION, SOLUTION SUBCUTANEOUS WEEKLY
Qty: 9 ML | Refills: 0 | Status: SHIPPED | OUTPATIENT
Start: 2022-06-29 | End: 2022-09-27

## 2022-07-09 ENCOUNTER — LAB ENCOUNTER (OUTPATIENT)
Dept: LAB | Facility: HOSPITAL | Age: 47
End: 2022-07-09
Attending: INTERNAL MEDICINE
Payer: COMMERCIAL

## 2022-07-09 DIAGNOSIS — E78.00 PURE HYPERCHOLESTEROLEMIA: ICD-10-CM

## 2022-07-09 DIAGNOSIS — E11.65 UNCONTROLLED TYPE 2 DIABETES MELLITUS WITH HYPERGLYCEMIA (HCC): ICD-10-CM

## 2022-07-09 DIAGNOSIS — E03.9 HYPOTHYROIDISM, UNSPECIFIED TYPE: ICD-10-CM

## 2022-07-09 LAB
ALBUMIN SERPL-MCNC: 3.4 G/DL (ref 3.4–5)
ALBUMIN/GLOB SERPL: 0.7 {RATIO} (ref 1–2)
ALP LIVER SERPL-CCNC: 75 U/L
ALT SERPL-CCNC: 27 U/L
ANION GAP SERPL CALC-SCNC: 8 MMOL/L (ref 0–18)
AST SERPL-CCNC: 18 U/L (ref 15–37)
BILIRUB SERPL-MCNC: 0.4 MG/DL (ref 0.1–2)
BUN BLD-MCNC: 13 MG/DL (ref 7–18)
BUN/CREAT SERPL: 15.1 (ref 10–20)
CALCIUM BLD-MCNC: 9.9 MG/DL (ref 8.5–10.1)
CHLORIDE SERPL-SCNC: 106 MMOL/L (ref 98–112)
CHOLEST SERPL-MCNC: 202 MG/DL (ref ?–200)
CO2 SERPL-SCNC: 26 MMOL/L (ref 21–32)
CREAT BLD-MCNC: 0.86 MG/DL
CREAT UR-SCNC: 228 MG/DL
FASTING PATIENT LIPID ANSWER: YES
FASTING STATUS PATIENT QL REPORTED: YES
GLOBULIN PLAS-MCNC: 4.7 G/DL (ref 2.8–4.4)
GLUCOSE BLD-MCNC: 111 MG/DL (ref 70–99)
HDLC SERPL-MCNC: 42 MG/DL (ref 40–59)
LDLC SERPL CALC-MCNC: 143 MG/DL (ref ?–100)
MICROALBUMIN UR-MCNC: 2.75 MG/DL
MICROALBUMIN/CREAT 24H UR-RTO: 12.1 UG/MG (ref ?–30)
NONHDLC SERPL-MCNC: 160 MG/DL (ref ?–130)
OSMOLALITY SERPL CALC.SUM OF ELEC: 291 MOSM/KG (ref 275–295)
POTASSIUM SERPL-SCNC: 4.5 MMOL/L (ref 3.5–5.1)
PROT SERPL-MCNC: 8.1 G/DL (ref 6.4–8.2)
SODIUM SERPL-SCNC: 140 MMOL/L (ref 136–145)
T4 FREE SERPL-MCNC: 1 NG/DL (ref 0.8–1.7)
THYROGLOB SERPL-MCNC: <15 U/ML (ref ?–60)
THYROPEROXIDASE AB SERPL-ACNC: 42 U/ML (ref ?–60)
TRIGL SERPL-MCNC: 95 MG/DL (ref 30–149)
TSI SER-ACNC: 1.82 MIU/ML (ref 0.36–3.74)
VLDLC SERPL CALC-MCNC: 18 MG/DL (ref 0–30)

## 2022-07-09 PROCEDURE — 86376 MICROSOMAL ANTIBODY EACH: CPT

## 2022-07-09 PROCEDURE — 84439 ASSAY OF FREE THYROXINE: CPT

## 2022-07-09 PROCEDURE — 84443 ASSAY THYROID STIM HORMONE: CPT

## 2022-07-09 PROCEDURE — 82043 UR ALBUMIN QUANTITATIVE: CPT

## 2022-07-09 PROCEDURE — 36415 COLL VENOUS BLD VENIPUNCTURE: CPT

## 2022-07-09 PROCEDURE — 80053 COMPREHEN METABOLIC PANEL: CPT

## 2022-07-09 PROCEDURE — 86800 THYROGLOBULIN ANTIBODY: CPT

## 2022-07-09 PROCEDURE — 3061F NEG MICROALBUMINURIA REV: CPT | Performed by: INTERNAL MEDICINE

## 2022-07-09 PROCEDURE — 80061 LIPID PANEL: CPT

## 2022-07-09 PROCEDURE — 82570 ASSAY OF URINE CREATININE: CPT

## 2022-08-01 DIAGNOSIS — E11.65 UNCONTROLLED TYPE 2 DIABETES MELLITUS WITH HYPERGLYCEMIA (HCC): ICD-10-CM

## 2022-08-03 RX ORDER — EMPAGLIFLOZIN 25 MG/1
TABLET, FILM COATED ORAL
Qty: 90 TABLET | Refills: 0 | Status: SHIPPED | OUTPATIENT
Start: 2022-08-03

## 2022-10-19 ENCOUNTER — OFFICE VISIT (OUTPATIENT)
Dept: ENDOCRINOLOGY CLINIC | Facility: CLINIC | Age: 47
End: 2022-10-19
Payer: COMMERCIAL

## 2022-10-19 VITALS — BODY MASS INDEX: 41 KG/M2 | WEIGHT: 230 LBS | DIASTOLIC BLOOD PRESSURE: 90 MMHG | SYSTOLIC BLOOD PRESSURE: 138 MMHG

## 2022-10-19 DIAGNOSIS — E03.9 HYPOTHYROIDISM, UNSPECIFIED TYPE: ICD-10-CM

## 2022-10-19 DIAGNOSIS — E66.01 CLASS 3 SEVERE OBESITY DUE TO EXCESS CALORIES WITH SERIOUS COMORBIDITY AND BODY MASS INDEX (BMI) OF 40.0 TO 44.9 IN ADULT (HCC): ICD-10-CM

## 2022-10-19 DIAGNOSIS — E78.00 PURE HYPERCHOLESTEROLEMIA: ICD-10-CM

## 2022-10-19 DIAGNOSIS — E11.65 UNCONTROLLED TYPE 2 DIABETES MELLITUS WITH HYPERGLYCEMIA (HCC): Primary | ICD-10-CM

## 2022-10-19 LAB
CARTRIDGE LOT#: ABNORMAL NUMERIC
HEMOGLOBIN A1C: 6.1 % (ref 4.3–5.6)
TEST STRIP LOT #: NORMAL NUMERIC

## 2022-10-19 PROCEDURE — 82947 ASSAY GLUCOSE BLOOD QUANT: CPT | Performed by: INTERNAL MEDICINE

## 2022-10-19 PROCEDURE — 3044F HG A1C LEVEL LT 7.0%: CPT | Performed by: INTERNAL MEDICINE

## 2022-10-19 PROCEDURE — 3075F SYST BP GE 130 - 139MM HG: CPT | Performed by: INTERNAL MEDICINE

## 2022-10-19 PROCEDURE — 3080F DIAST BP >= 90 MM HG: CPT | Performed by: INTERNAL MEDICINE

## 2022-10-19 PROCEDURE — 83036 HEMOGLOBIN GLYCOSYLATED A1C: CPT | Performed by: INTERNAL MEDICINE

## 2022-10-19 PROCEDURE — 99214 OFFICE O/P EST MOD 30 MIN: CPT | Performed by: INTERNAL MEDICINE

## 2022-10-19 RX ORDER — ATORVASTATIN CALCIUM 80 MG/1
80 TABLET, FILM COATED ORAL NIGHTLY
Qty: 90 TABLET | Refills: 0 | Status: SHIPPED | OUTPATIENT
Start: 2022-10-19 | End: 2023-01-17

## 2022-10-19 RX ORDER — TIRZEPATIDE 5 MG/.5ML
5 INJECTION, SOLUTION SUBCUTANEOUS WEEKLY
Qty: 2 ML | Refills: 0 | Status: SHIPPED | OUTPATIENT
Start: 2022-10-19 | End: 2022-11-18

## 2022-10-24 RX ORDER — LANCETS 28 GAUGE
EACH MISCELLANEOUS
Qty: 300 EACH | Refills: 1 | Status: SHIPPED | OUTPATIENT
Start: 2022-10-24

## 2022-10-24 NOTE — TELEPHONE ENCOUNTER
LOV: 10/19/22    Future Appointments   Date Time Provider Doroteo Jasso   1/25/2023 10:30 AM Paul Montana MD [facility]     Refilled per protocol.

## 2022-10-27 RX ORDER — BLOOD-GLUCOSE METER
KIT MISCELLANEOUS
Qty: 300 STRIP | Refills: 0 | Status: SHIPPED | OUTPATIENT
Start: 2022-10-27

## 2022-11-09 DIAGNOSIS — E66.01 CLASS 3 SEVERE OBESITY DUE TO EXCESS CALORIES WITH SERIOUS COMORBIDITY AND BODY MASS INDEX (BMI) OF 40.0 TO 44.9 IN ADULT (HCC): ICD-10-CM

## 2022-11-09 DIAGNOSIS — E11.65 UNCONTROLLED TYPE 2 DIABETES MELLITUS WITH HYPERGLYCEMIA (HCC): ICD-10-CM

## 2022-11-09 RX ORDER — TIRZEPATIDE 5 MG/.5ML
5 INJECTION, SOLUTION SUBCUTANEOUS WEEKLY
Qty: 2 ML | Refills: 0 | Status: CANCELLED | OUTPATIENT
Start: 2022-11-09 | End: 2022-12-09

## 2022-11-09 NOTE — TELEPHONE ENCOUNTER
Dr. Yung Logan,  Patient requesting increase in dose to 7.5mg mounjaro  Patient states her BG readings have been doing good/improved since starting mounjaro - she would like to go to next dose    RX for 7.5mg mounjaro pended for approval -thanks

## 2022-11-11 RX ORDER — TIRZEPATIDE 7.5 MG/.5ML
7.5 INJECTION, SOLUTION SUBCUTANEOUS WEEKLY
Qty: 2 ML | Refills: 0 | Status: SHIPPED | OUTPATIENT
Start: 2022-11-11

## 2022-12-08 DIAGNOSIS — E11.65 UNCONTROLLED TYPE 2 DIABETES MELLITUS WITH HYPERGLYCEMIA (HCC): ICD-10-CM

## 2022-12-09 RX ORDER — TIRZEPATIDE 7.5 MG/.5ML
7.5 INJECTION, SOLUTION SUBCUTANEOUS WEEKLY
Qty: 2 ML | Refills: 0 | Status: SHIPPED | OUTPATIENT
Start: 2022-12-09

## 2022-12-09 NOTE — TELEPHONE ENCOUNTER
LOV: 10/19/22    Future Appointments   Date Time Provider Doroteo Louisa   1/25/2023 10:30 AM Deshawn Weniberg MD [facility]     Refilled per protocol.

## 2022-12-14 NOTE — PATIENT INSTRUCTIONS
A1C: 12.4% on 2/25/2021  Blood glucose: 218 in clinic today       Medications:  -continue with Lantus 28 units once daily   -start metformin 1,000mg with breakfast meal     1,000mg with dinner meal   -start ozempic 0.25mg once weekly    Common side effects Left detailed VM for patient letting her know Assistance form is complete and faxed for the Jardiance. I scanned the completed copy in her chart under media. Would she like a hard copy?

## 2022-12-21 ENCOUNTER — TELEPHONE (OUTPATIENT)
Dept: ENDOCRINOLOGY CLINIC | Facility: CLINIC | Age: 47
End: 2022-12-21

## 2022-12-21 DIAGNOSIS — E11.65 UNCONTROLLED TYPE 2 DIABETES MELLITUS WITH HYPERGLYCEMIA (HCC): Primary | ICD-10-CM

## 2022-12-21 NOTE — TELEPHONE ENCOUNTER
heidi Curry 7.5mg out of stock  - please advise if patient should go back to 5mg  -thanks  F/U scheduled 1/25/23

## 2022-12-22 RX ORDER — TIRZEPATIDE 5 MG/.5ML
5 INJECTION, SOLUTION SUBCUTANEOUS WEEKLY
Qty: 2 ML | Refills: 0 | Status: SHIPPED | OUTPATIENT
Start: 2022-12-22 | End: 2023-01-21

## 2022-12-23 NOTE — TELEPHONE ENCOUNTER
LOV: 10/19/22    Future Appointments   Date Time Provider Doroteo Louisa   1[facility]2023 10:30 [facility]     Refilled per protocol.

## 2022-12-23 NOTE — TELEPHONE ENCOUNTER
Hi!  Let patient know for this month we will go back to the 5mg dose and that if necessary, for the next month, we can increase to 10mg if 7.5mg still not available. Thank you!

## 2022-12-23 NOTE — TELEPHONE ENCOUNTER
LVOM 1:45pm relaying Dr Jayne Chappell instruction and that she sent in the 5mg along with Metformin to the [facility] on file. Advised to call back with questions.

## 2023-01-03 DIAGNOSIS — E11.65 UNCONTROLLED TYPE 2 DIABETES MELLITUS WITH HYPERGLYCEMIA (HCC): ICD-10-CM

## 2023-01-04 RX ORDER — EMPAGLIFLOZIN 25 MG/1
TABLET, FILM COATED ORAL
Qty: 90 TABLET | Refills: 0 | Status: SHIPPED | OUTPATIENT
Start: 2023-01-04

## 2023-01-04 NOTE — TELEPHONE ENCOUNTER
LOV: 10/19/2022    RTC: 3 months     F/U: 1/25/2023    Dr Halle Lind-- orders pending, approve if appropriate

## 2023-01-25 ENCOUNTER — OFFICE VISIT (OUTPATIENT)
Dept: ENDOCRINOLOGY CLINIC | Facility: CLINIC | Age: 48
End: 2023-01-25

## 2023-01-25 VITALS
WEIGHT: 223 LBS | HEART RATE: 68 BPM | BODY MASS INDEX: 40 KG/M2 | DIASTOLIC BLOOD PRESSURE: 85 MMHG | SYSTOLIC BLOOD PRESSURE: 127 MMHG

## 2023-01-25 DIAGNOSIS — E66.01 CLASS 2 SEVERE OBESITY DUE TO EXCESS CALORIES WITH SERIOUS COMORBIDITY AND BODY MASS INDEX (BMI) OF 39.0 TO 39.9 IN ADULT (HCC): ICD-10-CM

## 2023-01-25 DIAGNOSIS — E78.00 PURE HYPERCHOLESTEROLEMIA: ICD-10-CM

## 2023-01-25 DIAGNOSIS — E03.9 HYPOTHYROIDISM, UNSPECIFIED TYPE: ICD-10-CM

## 2023-01-25 DIAGNOSIS — E11.65 TYPE 2 DIABETES MELLITUS WITH HYPERGLYCEMIA, WITHOUT LONG-TERM CURRENT USE OF INSULIN (HCC): Primary | ICD-10-CM

## 2023-01-25 DIAGNOSIS — I10 ESSENTIAL HYPERTENSION: ICD-10-CM

## 2023-01-25 PROBLEM — E66.812 CLASS 2 SEVERE OBESITY DUE TO EXCESS CALORIES WITH SERIOUS COMORBIDITY AND BODY MASS INDEX (BMI) OF 39.0 TO 39.9 IN ADULT (HCC): Status: ACTIVE | Noted: 2023-01-25

## 2023-01-25 LAB
CARTRIDGE LOT#: ABNORMAL NUMERIC
GLUCOSE BLOOD: 85
HEMOGLOBIN A1C: 6.2 % (ref 4.3–5.6)
TEST STRIP LOT #: NORMAL NUMERIC

## 2023-01-25 PROCEDURE — 83036 HEMOGLOBIN GLYCOSYLATED A1C: CPT | Performed by: INTERNAL MEDICINE

## 2023-01-25 PROCEDURE — 99214 OFFICE O/P EST MOD 30 MIN: CPT | Performed by: INTERNAL MEDICINE

## 2023-01-25 PROCEDURE — 3074F SYST BP LT 130 MM HG: CPT | Performed by: INTERNAL MEDICINE

## 2023-01-25 PROCEDURE — 3079F DIAST BP 80-89 MM HG: CPT | Performed by: INTERNAL MEDICINE

## 2023-01-25 PROCEDURE — 3044F HG A1C LEVEL LT 7.0%: CPT | Performed by: INTERNAL MEDICINE

## 2023-01-25 PROCEDURE — 82947 ASSAY GLUCOSE BLOOD QUANT: CPT | Performed by: INTERNAL MEDICINE

## 2023-02-04 ENCOUNTER — LAB ENCOUNTER (OUTPATIENT)
Dept: LAB | Facility: HOSPITAL | Age: 48
End: 2023-02-04
Attending: INTERNAL MEDICINE
Payer: COMMERCIAL

## 2023-02-04 DIAGNOSIS — E78.00 PURE HYPERCHOLESTEROLEMIA: ICD-10-CM

## 2023-02-04 DIAGNOSIS — E03.9 HYPOTHYROIDISM, UNSPECIFIED TYPE: ICD-10-CM

## 2023-02-04 DIAGNOSIS — E11.65 TYPE 2 DIABETES MELLITUS WITH HYPERGLYCEMIA, WITHOUT LONG-TERM CURRENT USE OF INSULIN (HCC): ICD-10-CM

## 2023-02-04 DIAGNOSIS — E11.65 UNCONTROLLED TYPE 2 DIABETES MELLITUS WITH HYPERGLYCEMIA (HCC): ICD-10-CM

## 2023-02-04 LAB
ALBUMIN SERPL-MCNC: 3.4 G/DL (ref 3.4–5)
ALBUMIN/GLOB SERPL: 0.8 {RATIO} (ref 1–2)
ALP LIVER SERPL-CCNC: 71 U/L
ALT SERPL-CCNC: 23 U/L
ANION GAP SERPL CALC-SCNC: 2 MMOL/L (ref 0–18)
AST SERPL-CCNC: 16 U/L (ref 15–37)
BILIRUB SERPL-MCNC: 0.3 MG/DL (ref 0.1–2)
BUN BLD-MCNC: 13 MG/DL (ref 7–18)
BUN/CREAT SERPL: 14.1 (ref 10–20)
CALCIUM BLD-MCNC: 9.1 MG/DL (ref 8.5–10.1)
CHLORIDE SERPL-SCNC: 112 MMOL/L (ref 98–112)
CHOLEST SERPL-MCNC: 177 MG/DL (ref ?–200)
CO2 SERPL-SCNC: 26 MMOL/L (ref 21–32)
CREAT BLD-MCNC: 0.92 MG/DL
CREAT UR-SCNC: 259 MG/DL
FASTING PATIENT LIPID ANSWER: YES
FASTING STATUS PATIENT QL REPORTED: YES
GFR SERPLBLD BASED ON 1.73 SQ M-ARVRAT: 77 ML/MIN/1.73M2 (ref 60–?)
GLOBULIN PLAS-MCNC: 4.3 G/DL (ref 2.8–4.4)
GLUCOSE BLD-MCNC: 89 MG/DL (ref 70–99)
HDLC SERPL-MCNC: 39 MG/DL (ref 40–59)
LDLC SERPL CALC-MCNC: 122 MG/DL (ref ?–100)
MICROALBUMIN UR-MCNC: 3.07 MG/DL
MICROALBUMIN/CREAT 24H UR-RTO: 11.9 UG/MG (ref ?–30)
NONHDLC SERPL-MCNC: 138 MG/DL (ref ?–130)
OSMOLALITY SERPL CALC.SUM OF ELEC: 290 MOSM/KG (ref 275–295)
POTASSIUM SERPL-SCNC: 3.6 MMOL/L (ref 3.5–5.1)
PROT SERPL-MCNC: 7.7 G/DL (ref 6.4–8.2)
SODIUM SERPL-SCNC: 140 MMOL/L (ref 136–145)
T4 FREE SERPL-MCNC: 1 NG/DL (ref 0.8–1.7)
TRIGL SERPL-MCNC: 85 MG/DL (ref 30–149)
TSI SER-ACNC: 1.43 MIU/ML (ref 0.36–3.74)
VLDLC SERPL CALC-MCNC: 15 MG/DL (ref 0–30)

## 2023-02-04 PROCEDURE — 82043 UR ALBUMIN QUANTITATIVE: CPT

## 2023-02-04 PROCEDURE — 84439 ASSAY OF FREE THYROXINE: CPT

## 2023-02-04 PROCEDURE — 82570 ASSAY OF URINE CREATININE: CPT

## 2023-02-04 PROCEDURE — 80061 LIPID PANEL: CPT

## 2023-02-04 PROCEDURE — 36415 COLL VENOUS BLD VENIPUNCTURE: CPT

## 2023-02-04 PROCEDURE — 3061F NEG MICROALBUMINURIA REV: CPT | Performed by: INTERNAL MEDICINE

## 2023-02-04 PROCEDURE — 84443 ASSAY THYROID STIM HORMONE: CPT

## 2023-02-04 PROCEDURE — 80053 COMPREHEN METABOLIC PANEL: CPT

## 2023-02-06 ENCOUNTER — TELEPHONE (OUTPATIENT)
Dept: ENDOCRINOLOGY CLINIC | Facility: CLINIC | Age: 48
End: 2023-02-06

## 2023-02-06 ENCOUNTER — PATIENT MESSAGE (OUTPATIENT)
Dept: ENDOCRINOLOGY CLINIC | Facility: CLINIC | Age: 48
End: 2023-02-06

## 2023-02-06 RX ORDER — TIRZEPATIDE 10 MG/.5ML
2 INJECTION, SOLUTION SUBCUTANEOUS
Qty: 2 ML | Refills: 2 | Status: SHIPPED | OUTPATIENT
Start: 2023-02-06

## 2023-02-06 RX ORDER — TIRZEPATIDE 10 MG/.5ML
2 INJECTION, SOLUTION SUBCUTANEOUS
Qty: 2 ML | Refills: 2 | Status: SHIPPED | OUTPATIENT
Start: 2023-02-06 | End: 2023-02-06

## 2023-02-06 NOTE — TELEPHONE ENCOUNTER
Srinivasan Cho states they need the ICD-10 code in order to process Mounjaro 10mg/weekly  Please follow up

## 2023-02-06 NOTE — TELEPHONE ENCOUNTER
Dr Skyler Benjamin,    Pt seen 1/25/2023 and advised to \"Continue Mounjaro 7.5mg qweekly and increase to 10mg qweekly\"    Pended rx for [employer] 10mg/weekly

## 2023-02-06 NOTE — TELEPHONE ENCOUNTER
From: Shivani Veras  To: Antonia Bardales MD  Sent: 2/6/2023 12:25 PM CST  Subject: Insulin     Ready for the next dose of mounjaro

## 2023-02-12 ENCOUNTER — PATIENT MESSAGE (OUTPATIENT)
Dept: ENDOCRINOLOGY CLINIC | Facility: CLINIC | Age: 48
End: 2023-02-12

## 2023-02-12 DIAGNOSIS — E11.65 TYPE 2 DIABETES MELLITUS WITH HYPERGLYCEMIA, WITHOUT LONG-TERM CURRENT USE OF INSULIN (HCC): Primary | ICD-10-CM

## 2023-02-13 ENCOUNTER — TELEPHONE (OUTPATIENT)
Dept: ENDOCRINOLOGY CLINIC | Facility: CLINIC | Age: 48
End: 2023-02-13

## 2023-02-13 DIAGNOSIS — E11.65 TYPE 2 DIABETES MELLITUS WITH HYPERGLYCEMIA, WITHOUT LONG-TERM CURRENT USE OF INSULIN (HCC): Primary | ICD-10-CM

## 2023-02-14 RX ORDER — TIRZEPATIDE 7.5 MG/.5ML
7.5 INJECTION, SOLUTION SUBCUTANEOUS WEEKLY
Qty: 2 ML | Refills: 0 | Status: SHIPPED | OUTPATIENT
Start: 2023-02-14 | End: 2023-03-16

## 2023-02-14 NOTE — TELEPHONE ENCOUNTER
Dr. Skyler Benjamin,  Patient on 10mg mounjaro - on b/o [place] - please advise if alt dose is recommended -thanks

## 2023-02-14 NOTE — TELEPHONE ENCOUNTER
I have prescribed the 7.5mg. If that is on back order, then we will have to prescribe whichever dose is available or Ozempic. Thank you.

## 2023-02-15 RX ORDER — TIRZEPATIDE 12.5 MG/.5ML
12.5 INJECTION, SOLUTION SUBCUTANEOUS WEEKLY
Qty: 2 ML | Refills: 2 | Status: SHIPPED | OUTPATIENT
Start: 2023-02-15 | End: 2023-03-17

## 2023-02-15 NOTE — TELEPHONE ENCOUNTER
Spoke to patient to advise 12.5mg mounjaro sent to [facility] and to advise patient to disregard [facility] about 7.5mg mounjaro (it is on b/o)  Patient stated understanding and will contact clinic with any issues

## 2023-02-15 NOTE — TELEPHONE ENCOUNTER
Dr. Cal Malik to [facility]): 7.5mg mounjaro b/o -RX for 7.5mg mounjaro sent yesterday (see [address] dtd 2/12/23)    Per pharmacist: 12.5mg mounjaro in stock/available at [facility]    Please advise if 12.5mg mounjaro is ok -thanks

## 2023-03-04 ENCOUNTER — TELEPHONE (OUTPATIENT)
Dept: ENDOCRINOLOGY CLINIC | Facility: CLINIC | Age: 48
End: 2023-03-04

## 2023-03-04 DIAGNOSIS — E78.00 PURE HYPERCHOLESTEROLEMIA: Primary | ICD-10-CM

## 2023-03-04 DIAGNOSIS — E11.65 TYPE 2 DIABETES MELLITUS WITH HYPERGLYCEMIA, WITHOUT LONG-TERM CURRENT USE OF INSULIN (HCC): ICD-10-CM

## 2023-03-04 NOTE — TELEPHONE ENCOUNTER
Hi!  Please let patient know that I have reviewed all of her blood work and they are all within normal limits except that her LDL is still elevated. It has definitely improved from before. I would like to make sure that she is still taking the atorvastatin and check again before she sees me again in July. Thank you!

## 2023-03-06 NOTE — TELEPHONE ENCOUNTER
Called patient and relayed Dr. Harrison Pak message as outlined below. Pt states she's consistently taking atorvastatin 80 mg daily although there are 1-2 days that she would miss but overall she's consistent. RN encouraged not to miss any days. Pt will get blood work done a few days before July appointment. Pt voiced understanding to the message and denied questions at this time.

## 2023-06-02 DIAGNOSIS — E11.65 UNCONTROLLED TYPE 2 DIABETES MELLITUS WITH HYPERGLYCEMIA (HCC): ICD-10-CM

## 2023-06-05 RX ORDER — EMPAGLIFLOZIN 25 MG/1
TABLET, FILM COATED ORAL
Qty: 90 TABLET | Refills: 0 | Status: SHIPPED | OUTPATIENT
Start: 2023-06-05

## 2023-06-12 ENCOUNTER — TELEPHONE (OUTPATIENT)
Dept: ENDOCRINOLOGY CLINIC | Facility: CLINIC | Age: 48
End: 2023-06-12

## 2023-06-12 NOTE — TELEPHONE ENCOUNTER
Medication PA Requested:  Tirzepatide (MOUNJARO) 12.5 MG/0.5ML Subcutaneous Solution Pen-injector,                                                        CoverMyMeds Used:  Key: [id number]  Quantity: 6 mL  Day Supply: 90 days   Sig: Inject 12.5 mg into the skin once a week  DX Code: E11.65                                    CPT code (if applicable):   Case Number/Pending Ref#:

## 2023-06-16 NOTE — TELEPHONE ENCOUNTER
Received hard copy of prior authorization from [employer] stating that authorization was approved for [facility]. Mounjaro 12.5 MG/0.5 ML Pen    Eff Date: 6/14/2023 - 6/13/2024    PA ref # [id number]    Document was placed in scan bag.

## 2023-06-16 NOTE — TELEPHONE ENCOUNTER
Per Epic epa Mounjaro 12.5 approved from 6/14/2023-6/13/2024, Case ID: [place], [phone], spoke with Krystina White. She states mounjaro went through.      My chart message sent to patient of approval.

## 2023-09-07 DIAGNOSIS — E11.65 UNCONTROLLED TYPE 2 DIABETES MELLITUS WITH HYPERGLYCEMIA (HCC): ICD-10-CM

## 2023-09-08 RX ORDER — EMPAGLIFLOZIN 25 MG/1
1 TABLET, FILM COATED ORAL DAILY
Qty: 90 TABLET | Refills: 0 | Status: SHIPPED | OUTPATIENT
Start: 2023-09-08

## 2023-09-08 NOTE — TELEPHONE ENCOUNTER
LOV: 1/25/2023    RTC: 6 Months    FU: 10/25/2023 @ 11:45 am     Last Refill: 6/5/2023    Last Labs: not done    3 Month Supply Pending     I called pt to help schedule a follow up appointment to secure future refills. I also informed pt that provider has ordered labs and if she can get them done before her appointment. Per pt agreed to appointment on 10/25/23 @ 11:45 am and she will get labs done.

## 2023-10-09 ENCOUNTER — LAB ENCOUNTER (OUTPATIENT)
Dept: LAB | Facility: HOSPITAL | Age: 48
End: 2023-10-09
Attending: INTERNAL MEDICINE
Payer: COMMERCIAL

## 2023-10-09 DIAGNOSIS — E11.65 TYPE 2 DIABETES MELLITUS WITH HYPERGLYCEMIA, WITHOUT LONG-TERM CURRENT USE OF INSULIN (HCC): ICD-10-CM

## 2023-10-09 DIAGNOSIS — E78.00 PURE HYPERCHOLESTEROLEMIA: ICD-10-CM

## 2023-10-09 LAB
CHOLEST SERPL-MCNC: 200 MG/DL (ref ?–200)
FASTING PATIENT LIPID ANSWER: YES
HDLC SERPL-MCNC: 47 MG/DL (ref 40–59)
LDLC SERPL CALC-MCNC: 141 MG/DL (ref ?–100)
NONHDLC SERPL-MCNC: 153 MG/DL (ref ?–130)
TRIGL SERPL-MCNC: 65 MG/DL (ref 30–149)
VLDLC SERPL CALC-MCNC: 12 MG/DL (ref 0–30)

## 2023-10-09 PROCEDURE — 80061 LIPID PANEL: CPT

## 2023-10-09 PROCEDURE — 36415 COLL VENOUS BLD VENIPUNCTURE: CPT

## 2023-10-25 ENCOUNTER — OFFICE VISIT (OUTPATIENT)
Dept: ENDOCRINOLOGY CLINIC | Facility: CLINIC | Age: 48
End: 2023-10-25

## 2023-10-25 VITALS
DIASTOLIC BLOOD PRESSURE: 72 MMHG | SYSTOLIC BLOOD PRESSURE: 136 MMHG | BODY MASS INDEX: 37 KG/M2 | HEART RATE: 69 BPM | HEIGHT: 63 IN | WEIGHT: 208.81 LBS

## 2023-10-25 DIAGNOSIS — E78.00 PURE HYPERCHOLESTEROLEMIA: ICD-10-CM

## 2023-10-25 DIAGNOSIS — E11.65 TYPE 2 DIABETES MELLITUS WITH HYPERGLYCEMIA, WITHOUT LONG-TERM CURRENT USE OF INSULIN (HCC): Primary | ICD-10-CM

## 2023-10-25 DIAGNOSIS — E03.9 HYPOTHYROIDISM, UNSPECIFIED TYPE: ICD-10-CM

## 2023-10-25 LAB
CARTRIDGE EXPIRATION DATE: ABNORMAL DATE
CARTRIDGE LOT#: ABNORMAL NUMERIC
GLUCOSE BLOOD: 118
HEMOGLOBIN A1C: 5.9 % (ref 4.3–5.6)
TEST STRIP EXPIRATION DATE: NORMAL DATE
TEST STRIP LOT #: NORMAL NUMERIC

## 2023-10-25 PROCEDURE — 82947 ASSAY GLUCOSE BLOOD QUANT: CPT | Performed by: INTERNAL MEDICINE

## 2023-10-25 PROCEDURE — 99214 OFFICE O/P EST MOD 30 MIN: CPT | Performed by: INTERNAL MEDICINE

## 2023-10-25 PROCEDURE — 3044F HG A1C LEVEL LT 7.0%: CPT | Performed by: INTERNAL MEDICINE

## 2023-10-25 PROCEDURE — 3078F DIAST BP <80 MM HG: CPT | Performed by: INTERNAL MEDICINE

## 2023-10-25 PROCEDURE — 3075F SYST BP GE 130 - 139MM HG: CPT | Performed by: INTERNAL MEDICINE

## 2023-10-25 PROCEDURE — 83036 HEMOGLOBIN GLYCOSYLATED A1C: CPT | Performed by: INTERNAL MEDICINE

## 2023-10-25 PROCEDURE — 3008F BODY MASS INDEX DOCD: CPT | Performed by: INTERNAL MEDICINE

## 2023-10-25 RX ORDER — ATORVASTATIN CALCIUM 80 MG/1
80 TABLET, FILM COATED ORAL NIGHTLY
Qty: 90 TABLET | Refills: 0 | Status: SHIPPED | OUTPATIENT
Start: 2023-10-25 | End: 2024-01-23

## 2023-10-25 NOTE — PROGRESS NOTES
Name: Dee Florez  Date: 10/25/23    Referring Physician: Dr. Lisandro Kennedy is a 50year old female who presents for follow-up of evaluation and management of uncontrolled Type 2 Diabetes. A few visits ago, I had stopped her Ozempic and started her on Mounjaro and she has been tolerating this well. She is currently on 12.5mg weekly.      Blood Glucose Today: 118  HbA1C or glycohemoglobin is 5.9 today (and it was 6.2 on 1/25/23 and it was 6.1 on 10/19/22 and it was 6.3 on 6/29/22 and it was 5.9 on 12/08/21 and it was on 9/08/21 and it was 6.3 on 6/02/21 and it was 12.4 %. 2/25/21)  Type 1 or Type 2?: Type 2  Checking 3 times per day  Fasting- above 100  Before meals- 100  Medications for DM : Mounjaro 12.5mg qweekly; metformin 1g PO bid; Jardiance 25mg PO qday  Episodes of hypoglycemia: none    Dietary compliance:   Trying to incorporate a lot of vegetables and fruits  Trying to bake foods, and trying to eat fish    Exercise: Walks all day at work    Polyuria/polydipsia: none    Blurred vision: none    Flu Vaccine This Season: does not get this    Covid vaccine: will get booster    REVIEW OF SYSTEMS  Eyes: Diabetic retinopathy present: none            Most recent visit to eye doctor in last 12 months: has appt in January 2024    CV: Cardiovascular disease present: none         Hypertension present: yes         Hyperlipidemia present: not at goal (10/09/23), on meds         Peripheral Vascular Disease present: none    : Nephropathy present: none (2/04/23); creatinine- 0.92    Neuro: Neuropathy present: none    Skin: Infection or ulceration: none    Osteoporosis: none    Thyroid disease: yes, is on levothyroxine 50mcg daily; TSH on 2/04/23 was 1.430    Medications:     Current Outpatient Medications:     atorvastatin 80 MG Oral Tab, Take 1 tablet (80 mg total) by mouth nightly., Disp: 90 tablet, Rfl: 0    Empagliflozin (JARDIANCE) 25 MG Oral Tab, Take 1 tablet by mouth daily., Disp: 90 tablet, Rfl: 0    Tirzepatide (MOUNJARO) 12.5 MG/0.5ML Subcutaneous Solution Pen-injector, Inject 12.5 mg into the skin once a week., Disp: 6 mL, Rfl: 3    METFORMIN HCL 1000 MG Oral Tab, Take 1 tablet (1,000 mg total) by mouth 2 (two) times daily with meals. , Disp: 180 tablet, Rfl: 1    FREESTYLE LANCETS Does not apply Misc, TEST BLOOD SUGARS 3 TIMES DAILY, Disp: 300 each, Rfl: 1    DROPLET PEN NEEDLES 32G X 4 MM Does not apply Misc, USE 4 TIMES DAILY, Disp: 400 each, Rfl: 0    Blood Glucose Monitoring Suppl (FREESTYLE FREEDOM LITE) w/Device Does not apply Kit, Use to check blood sugar 3x/day, Disp: 1 kit, Rfl: 0    Glucose Blood (BLOOD GLUCOSE TEST) In Vitro Strip, As Directed, Disp: , Rfl:     lisinopril 10 MG Oral Tab, , Disp: , Rfl: 3    Glucose Blood In Vitro Strip, USE AS DIRECTED TWICE DAILY, Disp: , Rfl:     Levothyroxine Sodium (SYNTHROID) 50 MCG Oral Tab, Take 1 tablet (50 mcg total) by mouth before breakfast., Disp: , Rfl: 5    Glucose Blood (FREESTYLE LITE TEST) In Vitro Strip, Use to check blood sugar 3 times per day, Disp: 300 strip, Rfl: 0    insulin glargine (LANTUS SOLOSTAR) 100 UNIT/ML Subcutaneous Solution Pen-injector, Inject 10 Units into the skin nightly. (Patient not taking: Reported on 12/8/2021), Disp: 9 mL, Rfl: 0    Insulin Lispro, 1 Unit Dial, (HUMALOG KWIKPEN) 100 UNIT/ML Subcutaneous Solution Pen-injector, Inject 5 Units into the skin 3 (three) times daily with meals. (Patient not taking: No sig reported), Disp: 15 mL, Rfl: 0    FreeStyle Lancets Does not apply Misc, Use to check blood sugar 3x/day, Disp: 300 each, Rfl: 0    Glucose Blood (ONETOUCH ULTRA) In Vitro Strip, 1 each by Other route 3 (three) times daily. Use to check blood sugars three times daily, Disp: 300 each, Rfl: 0    insulin detemir 100 UNIT/ML Subcutaneous Solution Pen-injector, Inject 24 Units into the skin daily.  (Patient not taking: No sig reported), Disp: 8 mL, Rfl: 0     Allergies:   No Known Allergies    Social History:   Social History     Socioeconomic History    Marital status:    Tobacco Use    Smoking status: Never    Smokeless tobacco: Never   Vaping Use    Vaping Use: Never used   Substance and Sexual Activity    Alcohol use: Yes    Drug use: No       Medical History:   Past Medical History:   Diagnosis Date    Diabetes ([facility]     Disorder of thyroid     High blood pressure     High cholesterol     Morbid obesity with BMI of 40.0-44.9, adult ([facility]     Obesity     Sleep apnea     mouthguard    Visual impairment     glasses       Surgical history:   Past Surgical History:   Procedure Laterality Date    CHOLECYSTECTOMY           PHYSICAL EXAM   10/25/23  1152   BP: 136/72   Pulse: 69   Weight: 208 lb 12.8 oz (94.7 kg)   Height: 5' 3\" (1.6 m)       General Appearance:  alert, well developed, in no acute distress  Eyes:  normal conjunctivae, sclera. , normal sclera and normal pupils  Ears/Nose/Mouth/Throat/Neck:  no palpable thyroid nodules or cervical lymphadenopathy  Neck: Trachea midline: Normal  Psychiatric:  oriented to time, self, and place  Nutritional:  no abnormal weight gain or loss    Lab Data:   Lab Results   Component Value Date     (H) 02/25/2021    A1C 5.9 (A) 10/25/2023     Lab Results   Component Value Date    GLU 89 02/04/2023    BUN 13 02/04/2023    BUNCREA 14.1 02/04/2023    CREATSERUM 0.92 02/04/2023    ANIONGAP 2 02/04/2023    GFRNAA 81 07/09/2022    GFRAA 93 07/09/2022    CA 9.1 02/04/2023    OSMOCALC 290 02/04/2023    ALKPHO 71 02/04/2023    AST 16 02/04/2023    ALT 23 02/04/2023    ALKPHOS 51 04/26/2016    BILT 0.3 02/04/2023    TP 7.7 02/04/2023    ALB 3.4 02/04/2023    GLOBULIN 4.3 02/04/2023    AGRATIO 0.8 (L) 04/26/2016     02/04/2023    K 3.6 02/04/2023     02/04/2023    CO2 26.0 02/04/2023     Lab Results   Component Value Date    CHOLEST 200 (H) 10/09/2023    TRIG 65 10/09/2023    HDL 47 10/09/2023     (H) 10/09/2023    VLDL 12 10/09/2023 Noreen 153 (H) 10/09/2023    RAFAEL 124 04/26/2016     Lab Results   Component Value Date    MALBP 3.07 02/04/2023    CREFAM 259.00 02/04/2023         ASSESSMENT/PLAN:    This is a 50year-old woman here for evaluation and management of uncontrolled type 2 diabetes. We discussed the ABCs of DM.     1.) Hyperglycemia Management- We discussed the importance of glycemic control to prevent complications of diabetes. We discussed the importance of SBGM. - Continue metformin 1g PO bid; Continue Jardiance 25mg PO qday  - Continue Mounjaro 12.5mg qweekly and increase to 10mg qweekly  - I explained to her that she should call our office with blood sugars <70 or >300  - For 2 weeks before her next appointment, she will check blood sugars fasting and two hours after biggest meal and we will slowly decrease the doses of her medications    2.) Management of Diabetic Complications- We discussed the complications of diabetes include retinopathy, neuropathy, nephropathy and cardiovascular disease.   - Neuropathy- none  - CAD- none  - MAC- none, check in 3 months  - Lipid panel- check in 3 months  - CMP- will check in 3 months  - Ophtho- has appt in January  - BP- at goal, not on meds  - Vaccines- will get booster    3.) Lifestyle Management for Diabetes- We discussed importance of a low CHO diet, and recommend 45gm per meal or 135gm per day. We discussed the importance of trying to follow a Mediterranean diet, with an emphasis on vegetables at every meal, with lots whole grains, and protein from either plant-based sources, or poultry and fish. - She is eating very healthy  - She is exercising daily    4.) Hypothyroidism  - Continue levothyroxine 50mg PO qday  - Check TSH and FT4 next year      Return to clinic in 3 months    Prior to this encounter, I spent over 15 minutes with preparing for the visit, including reviewing documents from other specialties as well as from PCP and going over test results.  During the face to face encounter, I spent an additional 15 minutes which were determined for follow-up. Greater than 50% of the time was spent in counseling, anticipatory guidance, and coordination of care. Patient concerns were answered to the best of my knowledge. 1/25/23  Antonia Chadwick MD

## 2024-01-30 ENCOUNTER — OFFICE VISIT (OUTPATIENT)
Dept: ENDOCRINOLOGY CLINIC | Facility: CLINIC | Age: 49
End: 2024-01-30

## 2024-01-30 VITALS
HEIGHT: 63 IN | SYSTOLIC BLOOD PRESSURE: 113 MMHG | DIASTOLIC BLOOD PRESSURE: 75 MMHG | WEIGHT: 209 LBS | BODY MASS INDEX: 37.03 KG/M2

## 2024-01-30 DIAGNOSIS — E11.65 TYPE 2 DIABETES MELLITUS WITH HYPERGLYCEMIA, WITHOUT LONG-TERM CURRENT USE OF INSULIN (HCC): Primary | ICD-10-CM

## 2024-01-30 DIAGNOSIS — E03.9 HYPOTHYROIDISM, UNSPECIFIED TYPE: ICD-10-CM

## 2024-01-30 DIAGNOSIS — E78.00 PURE HYPERCHOLESTEROLEMIA: ICD-10-CM

## 2024-01-30 LAB
CARTRIDGE LOT#: ABNORMAL NUMERIC
GLUCOSE BLOOD: 151
HEMOGLOBIN A1C: 6 % (ref 4.3–5.6)
TEST STRIP EXPIRATION DATE: NORMAL DATE
TEST STRIP LOT #: NORMAL NUMERIC

## 2024-01-30 PROCEDURE — 99214 OFFICE O/P EST MOD 30 MIN: CPT | Performed by: INTERNAL MEDICINE

## 2024-01-30 PROCEDURE — 83036 HEMOGLOBIN GLYCOSYLATED A1C: CPT | Performed by: INTERNAL MEDICINE

## 2024-01-30 PROCEDURE — 82947 ASSAY GLUCOSE BLOOD QUANT: CPT | Performed by: INTERNAL MEDICINE

## 2024-01-30 PROCEDURE — 3074F SYST BP LT 130 MM HG: CPT | Performed by: INTERNAL MEDICINE

## 2024-01-30 PROCEDURE — 3078F DIAST BP <80 MM HG: CPT | Performed by: INTERNAL MEDICINE

## 2024-01-30 PROCEDURE — 3008F BODY MASS INDEX DOCD: CPT | Performed by: INTERNAL MEDICINE

## 2024-01-30 PROCEDURE — 3044F HG A1C LEVEL LT 7.0%: CPT | Performed by: INTERNAL MEDICINE

## 2024-01-30 RX ORDER — TIRZEPATIDE 15 MG/.5ML
15 INJECTION, SOLUTION SUBCUTANEOUS WEEKLY
Qty: 6 ML | Refills: 3 | Status: SHIPPED | OUTPATIENT
Start: 2024-01-30 | End: 2024-04-29

## 2024-01-30 NOTE — PROGRESS NOTES
Name: Giselle Rodriguez  Date: 1/30/24    Referring Physician: Dr. Kareen Hernandez    HISTORY OF PRESENT ILLNESS   Giselle Rodriguez is a 48 year old female who presents for follow-up of evaluation and management of uncontrolled Type 2 Diabetes. A few visits ago, I had stopped her Ozempic and started her on Mounjaro and she has been tolerating this well. She is currently on 12.5mg weekly.     Blood Glucose Today: 151  HbA1C or glycohemoglobin is 6.0 today (and it was 5.9 on 10/25/23 and it was 6.2 on 1/25/23 and it was 6.1 on 10/19/22 and it was 6.3 on 6/29/22 and it was 5.9 on 12/08/21 and it was on 9/08/21 and it was 6.3 on 6/02/21 and it was 12.4 %. 2/25/21)  Type 1 or Type 2?: Type 2  Checking 3 times per day  Fasting- 100  Before meals- 100  Medications for DM : Mounjaro 12.5mg qweekly; metformin 1g PO bid; Jardiance 25mg PO qday  Episodes of hypoglycemia: none    Dietary compliance:   Trying to incorporate a lot of vegetables and fruits  Trying to bake foods, and trying to eat fish    Exercise: Walks all day at work    Polyuria/polydipsia: none    Blurred vision: none    Flu Vaccine This Season: does not get this    Covid vaccine: will get booster    REVIEW OF SYSTEMS  Eyes: Diabetic retinopathy present: none            Most recent visit to eye doctor in last 12 months: had appt in January 2024    CV: Cardiovascular disease present: none         Hypertension present: yes         Hyperlipidemia present: not at goal (10/09/23), on meds         Peripheral Vascular Disease present: none    : Nephropathy present: none (2/04/23); creatinine- 0.92    Neuro: Neuropathy present: none    Skin: Infection or ulceration: none    Osteoporosis: none    Thyroid disease: yes, is on levothyroxine 50mcg daily; TSH on 2/04/23 was 1.430    Medications:     Current Outpatient Medications:     Empagliflozin (JARDIANCE) 25 MG Oral Tab, Take 1 tablet by mouth daily., Disp: 90 tablet, Rfl: 0    Tirzepatide (MOUNJARO) 12.5 MG/0.5ML Subcutaneous  Solution Pen-injector, Inject 12.5 mg into the skin once a week., Disp: 6 mL, Rfl: 3    METFORMIN HCL 1000 MG Oral Tab, Take 1 tablet (1,000 mg total) by mouth 2 (two) times daily with meals., Disp: 180 tablet, Rfl: 1    Glucose Blood (FREESTYLE LITE TEST) In Vitro Strip, Use to check blood sugar 3 times per day, Disp: 300 strip, Rfl: 0    FREESTYLE LANCETS Does not apply Misc, TEST BLOOD SUGARS 3 TIMES DAILY, Disp: 300 each, Rfl: 1    insulin glargine (LANTUS SOLOSTAR) 100 UNIT/ML Subcutaneous Solution Pen-injector, Inject 10 Units into the skin nightly. (Patient not taking: Reported on 12/8/2021), Disp: 9 mL, Rfl: 0    Insulin Lispro, 1 Unit Dial, (HUMALOG KWIKPEN) 100 UNIT/ML Subcutaneous Solution Pen-injector, Inject 5 Units into the skin 3 (three) times daily with meals. (Patient not taking: No sig reported), Disp: 15 mL, Rfl: 0    DROPLET PEN NEEDLES 32G X 4 MM Does not apply Misc, USE 4 TIMES DAILY, Disp: 400 each, Rfl: 0    Blood Glucose Monitoring Suppl (FREESTYLE FREEDOM LITE) w/Device Does not apply Kit, Use to check blood sugar 3x/day, Disp: 1 kit, Rfl: 0    FreeStyle Lancets Does not apply Misc, Use to check blood sugar 3x/day, Disp: 300 each, Rfl: 0    Glucose Blood (ONETOUCH ULTRA) In Vitro Strip, 1 each by Other route 3 (three) times daily. Use to check blood sugars three times daily, Disp: 300 each, Rfl: 0    insulin detemir 100 UNIT/ML Subcutaneous Solution Pen-injector, Inject 24 Units into the skin daily. (Patient not taking: No sig reported), Disp: 8 mL, Rfl: 0    Glucose Blood (BLOOD GLUCOSE TEST) In Vitro Strip, As Directed, Disp: , Rfl:     lisinopril 10 MG Oral Tab, , Disp: , Rfl: 3    Glucose Blood In Vitro Strip, USE AS DIRECTED TWICE DAILY, Disp: , Rfl:     Levothyroxine Sodium (SYNTHROID) 50 MCG Oral Tab, Take 1 tablet (50 mcg total) by mouth before breakfast., Disp: , Rfl: 5     Allergies:   No Known Allergies    Social History:   Social History     Socioeconomic History    Marital  status:    Tobacco Use    Smoking status: Never    Smokeless tobacco: Never   Vaping Use    Vaping Use: Never used   Substance and Sexual Activity    Alcohol use: Yes    Drug use: No       Medical History:   Past Medical History:   Diagnosis Date    Diabetes ([facility])     Disorder of thyroid     High blood pressure     High cholesterol     Morbid obesity with BMI of 40.0-44.9, adult ([facility])     Obesity     Sleep apnea     mouthguard    Visual impairment     glasses       Surgical history:   Past Surgical History:   Procedure Laterality Date    CHOLECYSTECTOMY           PHYSICAL EXAM  Vitals:    01/30/24 1052   BP: 113/75   Weight: 209 lb (94.8 kg)   Height: 5' 3\" (1.6 m)         General Appearance:  alert, well developed, in no acute distress  Eyes:  normal conjunctivae, sclera., normal sclera and normal pupils  Ears/Nose/Mouth/Throat/Neck:  no palpable thyroid nodules or cervical lymphadenopathy  Neck: Trachea midline: Normal  Psychiatric:  oriented to time, self, and place  Nutritional:  no abnormal weight gain or loss    Lab Data:   Lab Results   Component Value Date     (H) 02/25/2021    A1C 6.0 (A) 01/30/2024     Lab Results   Component Value Date    GLU 89 02/04/2023    BUN 13 02/04/2023    BUNCREA 14.1 02/04/2023    CREATSERUM 0.92 02/04/2023    ANIONGAP 2 02/04/2023    GFRNAA 81 07/09/2022    GFRAA 93 07/09/2022    CA 9.1 02/04/2023    OSMOCALC 290 02/04/2023    ALKPHO 71 02/04/2023    AST 16 02/04/2023    ALT 23 02/04/2023    ALKPHOS 51 04/26/2016    BILT 0.3 02/04/2023    TP 7.7 02/04/2023    ALB 3.4 02/04/2023    GLOBULIN 4.3 02/04/2023    AGRATIO 0.8 (L) 04/26/2016     02/04/2023    K 3.6 02/04/2023     02/04/2023    CO2 26.0 02/04/2023     Lab Results   Component Value Date    CHOLEST 200 (H) 10/09/2023    TRIG 65 10/09/2023    HDL 47 10/09/2023     (H) 10/09/2023    VLDL 12 10/09/2023    NONHDLC 153 (H) 10/09/2023    CALCNONHDL 124 04/26/2016     Lab Results   Component Value  Date    MALBP 3.07 02/04/2023    CREUR 259.00 02/04/2023         ASSESSMENT/PLAN:    This is a 48 year-old woman here for evaluation and management of uncontrolled type 2 diabetes. We discussed the ABCs of DM.     1.) Hyperglycemia Management- We discussed the importance of glycemic control to prevent complications of diabetes. We discussed the importance of SBGM.  - Decrease metformin to 1g PO qPM; Continue Jardiance 25mg PO qday  - Increase Mounjaro from 12.5mgto 15mg qweekly   - I explained to her that she should call our office with blood sugars <70 or >300  - For 2 weeks before her next appointment, she will check blood sugars fasting and two hours after biggest meal and we will slowly decrease the doses of her medications    2.) Management of Diabetic Complications- We discussed the complications of diabetes include retinopathy, neuropathy, nephropathy and cardiovascular disease.   - Neuropathy- none  - CAD- none  - MAC- none, check now  - Lipid panel- check now  - CMP- will check now  - Ophtho- up to date  - BP- at goal, not on meds  - Vaccines- will get booster    3.) Lifestyle Management for Diabetes- We discussed importance of a low CHO diet, and recommend 45gm per meal or 135gm per day. We discussed the importance of trying to follow a Mediterranean diet, with an emphasis on vegetables at every meal, with lots whole grains, and protein from either plant-based sources, or poultry and fish.   - She is eating very healthy  - She is exercising daily    4.) Hypothyroidism  - Continue levothyroxine 50mg PO qday  - Check TSH and FT4 now      Return to clinic in 6 months    Prior to this encounter, I spent over 15 minutes with preparing for the visit, including reviewing documents from other specialties as well as from PCP and going over test results. During the face to face encounter, I spent an additional 15 minutes which were determined for follow-up. Greater than 50% of the time was spent in counseling,  anticipatory guidance, and coordination of care. Patient concerns were answered to the best of my knowledge.     1/30/24  Antonia Hicks MD

## 2024-02-10 ENCOUNTER — LAB ENCOUNTER (OUTPATIENT)
Dept: LAB | Facility: HOSPITAL | Age: 49
End: 2024-02-10
Attending: INTERNAL MEDICINE
Payer: COMMERCIAL

## 2024-02-10 DIAGNOSIS — E03.9 HYPOTHYROIDISM, UNSPECIFIED TYPE: ICD-10-CM

## 2024-02-10 DIAGNOSIS — E11.65 TYPE 2 DIABETES MELLITUS WITH HYPERGLYCEMIA, WITHOUT LONG-TERM CURRENT USE OF INSULIN (HCC): ICD-10-CM

## 2024-02-10 LAB
ALBUMIN SERPL-MCNC: 3.9 G/DL (ref 3.2–4.8)
ALBUMIN/GLOB SERPL: 1 {RATIO} (ref 1–2)
ALP LIVER SERPL-CCNC: 71 U/L
ALT SERPL-CCNC: 19 U/L
ANION GAP SERPL CALC-SCNC: 5 MMOL/L (ref 0–18)
AST SERPL-CCNC: 18 U/L (ref ?–34)
BILIRUB SERPL-MCNC: 0.4 MG/DL (ref 0.3–1.2)
BUN BLD-MCNC: 11 MG/DL (ref 9–23)
BUN/CREAT SERPL: 12.1 (ref 10–20)
CALCIUM BLD-MCNC: 9 MG/DL (ref 8.7–10.4)
CHLORIDE SERPL-SCNC: 111 MMOL/L (ref 98–112)
CHOLEST SERPL-MCNC: 143 MG/DL (ref ?–200)
CO2 SERPL-SCNC: 27 MMOL/L (ref 21–32)
CREAT BLD-MCNC: 0.91 MG/DL
CREAT UR-SCNC: 158.5 MG/DL
EGFRCR SERPLBLD CKD-EPI 2021: 78 ML/MIN/1.73M2 (ref 60–?)
FASTING PATIENT LIPID ANSWER: YES
FASTING STATUS PATIENT QL REPORTED: YES
GLOBULIN PLAS-MCNC: 3.8 G/DL (ref 2.8–4.4)
GLUCOSE BLD-MCNC: 85 MG/DL (ref 70–99)
HDLC SERPL-MCNC: 41 MG/DL (ref 40–59)
LDLC SERPL CALC-MCNC: 91 MG/DL (ref ?–100)
MICROALBUMIN UR-MCNC: 0.5 MG/DL
MICROALBUMIN/CREAT 24H UR-RTO: 3.2 UG/MG (ref ?–30)
NONHDLC SERPL-MCNC: 102 MG/DL (ref ?–130)
OSMOLALITY SERPL CALC.SUM OF ELEC: 295 MOSM/KG (ref 275–295)
POTASSIUM SERPL-SCNC: 4.4 MMOL/L (ref 3.5–5.1)
PROT SERPL-MCNC: 7.7 G/DL (ref 5.7–8.2)
SODIUM SERPL-SCNC: 143 MMOL/L (ref 136–145)
T4 FREE SERPL-MCNC: 1 NG/DL (ref 0.8–1.7)
TRIGL SERPL-MCNC: 50 MG/DL (ref 30–149)
TSI SER-ACNC: 2.5 MIU/ML (ref 0.55–4.78)
VLDLC SERPL CALC-MCNC: 8 MG/DL (ref 0–30)

## 2024-02-10 PROCEDURE — 84443 ASSAY THYROID STIM HORMONE: CPT

## 2024-02-10 PROCEDURE — 36415 COLL VENOUS BLD VENIPUNCTURE: CPT

## 2024-02-10 PROCEDURE — 82043 UR ALBUMIN QUANTITATIVE: CPT

## 2024-02-10 PROCEDURE — 80053 COMPREHEN METABOLIC PANEL: CPT

## 2024-02-10 PROCEDURE — 84439 ASSAY OF FREE THYROXINE: CPT

## 2024-02-10 PROCEDURE — 80061 LIPID PANEL: CPT

## 2024-02-10 PROCEDURE — 82570 ASSAY OF URINE CREATININE: CPT

## 2024-04-01 DIAGNOSIS — E78.00 PURE HYPERCHOLESTEROLEMIA: ICD-10-CM

## 2024-04-01 DIAGNOSIS — E11.65 TYPE 2 DIABETES MELLITUS WITH HYPERGLYCEMIA, WITHOUT LONG-TERM CURRENT USE OF INSULIN (HCC): ICD-10-CM

## 2024-04-02 NOTE — TELEPHONE ENCOUNTER
Endocrine refill protocol for lipid lowering medications      Protocol Criteria:  Appointment with Endocrinology completed in the last 6 months or scheduled in the next 3 months     Verify appointment has been completed or scheduled in the appropriate timeline. If so can send a 90 day supply with 1 refill.   Lipid panel must have been completed in the last 12 months   ALT result <80  LDL result <130    Cholesterol: 195, done on 8/14/2023.  HDL Cholesterol: 56, done on 8/14/2023.  LDL Cholesterol: 124, done on 8/14/2023.  TriGlycerides 82, done on 8/14/2023.     Last completed office visit: 1/30/24  Next scheduled follow up: 7/30/24    Last Lipid panel date: 2/10/24    Last ALT result:   Component      Latest Ref Rng 2/10/2024   ALT (SGPT)      10 - 49 U/L 19        Last LDL result:  Component      Latest Ref Rng 2/10/2024   VLDL      0 - 30 mg/dL 8

## 2024-04-03 RX ORDER — ATORVASTATIN CALCIUM 80 MG/1
80 TABLET, FILM COATED ORAL NIGHTLY
Qty: 90 TABLET | Refills: 1 | Status: SHIPPED | OUTPATIENT
Start: 2024-04-03 | End: 2024-09-30

## 2024-04-03 NOTE — TELEPHONE ENCOUNTER
LOV: 01/30/2024     Next office visit: 07/30/2024     Last filled: 12/23/2022     Order pended and routed to provider

## 2024-11-28 ENCOUNTER — HOSPITAL ENCOUNTER (EMERGENCY)
Facility: HOSPITAL | Age: 49
Discharge: HOME | End: 2024-11-28
Payer: COMMERCIAL

## 2024-11-28 ENCOUNTER — APPOINTMENT (OUTPATIENT)
Dept: CT IMAGING | Facility: HOSPITAL | Age: 49
End: 2024-11-28
Attending: NURSE PRACTITIONER
Payer: COMMERCIAL

## 2024-11-28 VITALS
HEART RATE: 105 BPM | DIASTOLIC BLOOD PRESSURE: 82 MMHG | BODY MASS INDEX: 40.75 KG/M2 | SYSTOLIC BLOOD PRESSURE: 144 MMHG | HEIGHT: 63 IN | WEIGHT: 230 LBS | OXYGEN SATURATION: 95 % | RESPIRATION RATE: 18 BRPM | TEMPERATURE: 98 F

## 2024-11-28 DIAGNOSIS — N20.0 KIDNEY STONE: Primary | ICD-10-CM

## 2024-11-28 LAB
ANION GAP SERPL CALC-SCNC: 7 MMOL/L (ref 0–18)
B-HCG UR QL: NEGATIVE
BASOPHILS # BLD AUTO: 0.02 X10(3) UL (ref 0–0.2)
BASOPHILS NFR BLD AUTO: 0.2 %
BILIRUB UR QL CFM: POSITIVE
BUN BLD-MCNC: 13 MG/DL (ref 9–23)
BUN/CREAT SERPL: 11.7 (ref 10–20)
CALCIUM BLD-MCNC: 9.7 MG/DL (ref 8.7–10.4)
CHLORIDE SERPL-SCNC: 108 MMOL/L (ref 98–112)
CO2 SERPL-SCNC: 25 MMOL/L (ref 21–32)
CREAT BLD-MCNC: 1.11 MG/DL
DEPRECATED RDW RBC AUTO: 45.9 FL (ref 35.1–46.3)
EGFRCR SERPLBLD CKD-EPI 2021: 61 ML/MIN/1.73M2 (ref 60–?)
EOSINOPHIL # BLD AUTO: 0.01 X10(3) UL (ref 0–0.7)
EOSINOPHIL NFR BLD AUTO: 0.1 %
ERYTHROCYTE [DISTWIDTH] IN BLOOD BY AUTOMATED COUNT: 13.3 % (ref 11–15)
GLUCOSE BLD-MCNC: 98 MG/DL (ref 70–99)
GLUCOSE UR-MCNC: NORMAL MG/DL
HCT VFR BLD AUTO: 45.1 %
HGB BLD-MCNC: 14.2 G/DL
IMM GRANULOCYTES # BLD AUTO: 0.02 X10(3) UL (ref 0–1)
IMM GRANULOCYTES NFR BLD: 0.2 %
KETONES UR-MCNC: NEGATIVE MG/DL
LEUKOCYTE ESTERASE UR QL STRIP.AUTO: NEGATIVE
LYMPHOCYTES # BLD AUTO: 1.19 X10(3) UL (ref 1–4)
LYMPHOCYTES NFR BLD AUTO: 11.5 %
MCH RBC QN AUTO: 29 PG (ref 26–34)
MCHC RBC AUTO-ENTMCNC: 31.5 G/DL (ref 31–37)
MCV RBC AUTO: 92 FL
MONOCYTES # BLD AUTO: 0.41 X10(3) UL (ref 0.1–1)
MONOCYTES NFR BLD AUTO: 4 %
NEUTROPHILS # BLD AUTO: 8.72 X10 (3) UL (ref 1.5–7.7)
NEUTROPHILS # BLD AUTO: 8.72 X10(3) UL (ref 1.5–7.7)
NEUTROPHILS NFR BLD AUTO: 84 %
OSMOLALITY SERPL CALC.SUM OF ELEC: 290 MOSM/KG (ref 275–295)
PH UR: 5.5 [PH] (ref 5–8)
PLATELET # BLD AUTO: 260 10(3)UL (ref 150–450)
PLATELETS.RETICULATED NFR BLD AUTO: 5.7 % (ref 0–7)
POTASSIUM SERPL-SCNC: 4.2 MMOL/L (ref 3.5–5.1)
PROT UR-MCNC: 20 MG/DL
RBC # BLD AUTO: 4.9 X10(6)UL
RBC #/AREA URNS AUTO: >10 /HPF
SODIUM SERPL-SCNC: 140 MMOL/L (ref 136–145)
SP GR UR STRIP: 1.01 (ref 1–1.03)
UROBILINOGEN UR STRIP-ACNC: 2
WBC # BLD AUTO: 10.4 X10(3) UL (ref 4–11)

## 2024-11-28 PROCEDURE — 96361 HYDRATE IV INFUSION ADD-ON: CPT

## 2024-11-28 PROCEDURE — 81001 URINALYSIS AUTO W/SCOPE: CPT

## 2024-11-28 PROCEDURE — 96374 THER/PROPH/DIAG INJ IV PUSH: CPT

## 2024-11-28 PROCEDURE — 87086 URINE CULTURE/COLONY COUNT: CPT

## 2024-11-28 PROCEDURE — 80048 BASIC METABOLIC PNL TOTAL CA: CPT

## 2024-11-28 PROCEDURE — 99284 EMERGENCY DEPT VISIT MOD MDM: CPT

## 2024-11-28 PROCEDURE — 81025 URINE PREGNANCY TEST: CPT

## 2024-11-28 PROCEDURE — 85025 COMPLETE CBC W/AUTO DIFF WBC: CPT

## 2024-11-28 PROCEDURE — 74176 CT ABD & PELVIS W/O CONTRAST: CPT | Performed by: NURSE PRACTITIONER

## 2024-11-28 RX ORDER — KETOROLAC TROMETHAMINE 10 MG/1
10 TABLET, FILM COATED ORAL EVERY 6 HOURS PRN
Qty: 15 TABLET | Refills: 0 | Status: SHIPPED | OUTPATIENT
Start: 2024-11-28 | End: 2024-12-05

## 2024-11-28 RX ORDER — KETOROLAC TROMETHAMINE 15 MG/ML
15 INJECTION, SOLUTION INTRAMUSCULAR; INTRAVENOUS ONCE
Status: COMPLETED | OUTPATIENT
Start: 2024-11-28 | End: 2024-11-28

## 2024-11-28 RX ORDER — TAMSULOSIN HYDROCHLORIDE 0.4 MG/1
0.4 CAPSULE ORAL DAILY
Qty: 7 CAPSULE | Refills: 0 | Status: SHIPPED | OUTPATIENT
Start: 2024-11-28 | End: 2024-12-05

## 2024-11-28 NOTE — ED PROVIDER NOTES
Patient Seen in: [facility] Emergency Department      History     Chief Complaint   Patient presents with    Urinary Symptoms     Stated Complaint: Urinary Symptoms    Subjective:   49-year-old female with history of type 2 diabetes presenting with acute onset of dysuria and back pain onset this morning.  Pain described as a cramping pain across the lower back.  She did experience 1 episode of emesis after taking a second dose of Azo this morning.  She denies any fevers, chills, or history of kidney stones in the past.  She does report this feels similar to prior urinary tract infections however worse.              Objective:     Past Medical History:    Diabetes (HCC)    Disorder of thyroid    High blood pressure    High cholesterol    Morbid obesity with BMI of 40.0-44.9, adult (HCC)    Obesity    Sleep apnea    mouthguard    Visual impairment    glasses              Past Surgical History:   Procedure Laterality Date    Cholecystectomy                  Social History     Socioeconomic History    Marital status:    Tobacco Use    Smoking status: Never    Smokeless tobacco: Never   Vaping Use    Vaping status: Never Used   Substance and Sexual Activity    Alcohol use: Yes    Drug use: No     Social Drivers of Health     Financial Resource Strain: Low Risk  (3/22/2022)    [facility]    Overall Financial Resource Strain (CARDIA)     Difficulty of Paying Living Expenses: Not hard at all   Food Insecurity: No Food Insecurity (3/22/2022)    [facility]    Hunger Vital Sign     Worried About Running Out of Food in the Last Year: Never true     Ran Out of Food in the Last Year: Never true   Transportation Needs: No Transportation Needs (3/22/2022)    [facility]    PRAPARE - Transportation     Lack of  Transportation (Medical): No     Lack of Transportation (Non-Medical): No                  Physical Exam     ED Triage Vitals [11/28/24 1237]   /82   Pulse 105   Resp 18   Temp 98.2 °F (36.8 °C)   Temp src Oral   SpO2 95 %   O2 Device None (Room air)       Current Vitals:   Vital Signs  BP: 144/82  Pulse: 105  Resp: 18  Temp: 98.2 °F (36.8 °C)  Temp src: Oral    Oxygen Therapy  SpO2: 95 %  O2 Device: None (Room air)        Physical Exam  Vitals and nursing note reviewed.   Constitutional:       Appearance: Normal appearance.   HENT:      Head: Normocephalic.      Right Ear: External ear normal.      Left Ear: External ear normal.      Nose: Nose normal.      Mouth/Throat:      Mouth: Mucous membranes are moist.   Eyes:      Extraocular Movements: Extraocular movements intact.      Conjunctiva/sclera: Conjunctivae normal.      Pupils: Pupils are equal, round, and reactive to light.   Cardiovascular:      Rate and Rhythm: Normal rate and regular rhythm.      Heart sounds: Normal heart sounds.   Pulmonary:      Effort: Pulmonary effort is normal.      Breath sounds: Normal breath sounds.   Abdominal:      Palpations: Abdomen is soft.      Tenderness: There is no abdominal tenderness. There is left CVA tenderness. There is no right CVA tenderness or guarding.   Musculoskeletal:         General: Normal range of motion.      Cervical back: Normal range of motion.   Skin:     General: Skin is warm and dry.   Neurological:      Mental Status: She is alert and oriented to person, place, and time.   Psychiatric:         Mood and Affect: Mood normal.         Behavior: Behavior normal.             ED Course     Labs Reviewed   URINALYSIS WITH CULTURE REFLEX - Abnormal; Notable for the following components:       Result Value    Clarity Urine Turbid (*)     Blood Urine 3+ (*)     Protein Urine 20 (*)     Urobilinogen Urine 2 (*)     Nitrite Urine 1+ (*)     RBC Urine >10 (*)     Bacteria Urine Rare (*)     Squamous Epi.  Cells Few (*)     All other components within normal limits   ICTOTEST - Abnormal; Notable for the following components:    Ictotest Positive (*)     All other components within normal limits   CBC WITH DIFFERENTIAL WITH PLATELET - Abnormal; Notable for the following components:    Neutrophil Absolute Prelim 8.72 (*)     Neutrophil Absolute 8.72 (*)     All other components within normal limits   BASIC METABOLIC PANEL (8) - Abnormal; Notable for the following components:    Creatinine 1.11 (*)     All other components within normal limits   POCT PREGNANCY URINE - Normal   URINE CULTURE, ROUTINE     CT ABDOMEN+PELVIS KIDNEYSTONE 2D RNDR(NO IV,NO ORAL)(CPT=74176)    Result Date: 11/28/2024  PROCEDURE:   CT ABDOMEN + PELVIS KIDNEYSTONE 2D RNDR (NO IV NO ORAL) (CPT=74176)  COMPARISON: [facility], CT ABDOMEN + PELVIS (CONTRAST ONLY) (CPT=74177), 10/08/2018, 11:13 AM.  [facility], CT PF RENAL STONE ABD/P WO CON, 5/26/2015, 5:45 PM.  INDICATIONS: lower abd pain  TECHNIQUE:   CT images of the abdomen and pelvis were obtained without intravenous contrast material.  Automated exposure control for dose reduction was used. Adjustment of the mA and/or kV was done based on the patient's size. Use of iterative reconstruction technique for dose reduction was used. Dose information is transmitted to the ACR (American College of Radiology) NRDR (National Radiology Data Registry) which includes the Dose Index Registry.   FINDINGS:  Examination is limited by artifact from portion of patient's body in contact with the scanner gantry.  COMMENT: Evaluation of the vasculature and of the abdominal viscera for the presence of intraparenchymal pathology is suboptimal in the absence of contrast infusion.  LUNG BASES: Visualized heart is normal in size.  No suspicious pulmonary consolidation.  No coronary artery calcifications are visualized.  LIVER: Unremarkable.   GALLBLADDER:  There are cholecystectomy clips  in the right upper quadrant.  BILIARY:  Chronic dilation of the common bile duct proximally, but with more normal distal tapering, likely postsurgical.  ADRENALS:  Unremarkable.  SPLEEN: Normal in size  PANCREAS: No main pancreatic duct dilation.  KIDNEYS:   There is mild left hydro ureteral nephrosis.  There is left periureteral fat stranding.  There is a 3 mm calcification in the left retroperitoneum, which was thought to be along the expected course of the mid left ureter, which is new from the  2018 examination (series 2, image 75).  There are phleboliths in the pelvis, which limits accurate assessment but are similar to the 2018 imaging.  There is left perinephric fat stranding with a mildly diffusely enlarged appearance of the left kidney relative to the right.  Punctate 2 mm nonobstructing right lower pole renal stone.  Previously seen 2 mm nonobstructing left upper pole renal stone from 2018 is not visualized.  BOWEL:   No obstruction. Portions of the bowel are under distended and not well assessed.   AORTA/VASCULAR:   No abdominal aortic aneurysm.  RETROPERITONEUM/MESENTERY: No enlarged adenopathy.  There is no free air or free fluid.  URINARY BLADDER:  Mildly distended. Unremarkable.  PELVIC NODES:  Unchanged from 2018. Bilateral iliac chain and inguinal lymph nodes are similar over multiple years, favoring a benign/indolent etiology.  PELVIC ORGANS:  Essure devices are present bilaterally.  BONES:   Mild degenerative disc disease.  ABDOMINAL WALL:  Small fat containing umbilical hernia.  OTHER: Unremarkable.           CONCLUSION:   Mild left hydroureteronephrosis.  This is thought to be secondary to a suspected 3 mm left mid ureteral stone, and a previously seen nonobstructing 2 mm stone is no longer present in the kidney.  It is difficult to entirely accurately localize this stone  however, and the differential would be a small phlebolith in this region.  Diffuse edema with perinephric stranding of the  left kidney.  Thought to be related to obstructive uropathy from the aforementioned and findings.  Differential would be ascending urinary tract infection in the appropriate clinical setting.  Similar appearance of prominent bilateral iliac chain/inguinal lymph nodes over multiple years, favoring a benign/reactive etiology.       Dictated by (CST): Renée Zamorano MD on 11/28/2024 at 2:31 PM     Finalized by (CST): Renée Zamorano MD on 11/28/2024 at 2:39 PM                       [facility]              Medical Decision Making  Frontals include renal colic versus cystitis versus pyelonephritis versus other  Given no fevers or leukocytosis, less likely pyelonephritis or septic stone  Urinalysis and positive, likely from Azo use  CT does show a small 3 mm stone to the left ureter, consistent with patient's presentation  Pain is currently well-controlled with ketorolac  Will discharge home with Flomax as well as ketorolac for symptom relief  Return precautions discussed    Amount and/or Complexity of Data Reviewed  Labs: ordered. Decision-making details documented in ED Course.     Details: CBC without leukocytosis  Urinalysis pan positive, most likely secondary to Azo ingestion  BMP normal  Radiology: ordered. Decision-making details documented in ED Course.    Risk  Prescription drug management.        Disposition and Plan     Clinical Impression:  1. Kidney stone         Disposition:  Discharge  11/28/2024  2:51 pm    Follow-up:  Jere Bal MD  [address] [place], [place] [id number] [phone]    Follow up  As needed          Medications Prescribed:  Current Discharge Medication List        START taking these medications    Details   Ketorolac Tromethamine 10 MG Oral Tab Take 1 tablet (10 mg total) by mouth every 6 (six) hours as needed for Pain.  Qty: 15 tablet, Refills: 0    Comments: First dose given IV in ED      tamsulosin (FLOMAX) 0.4 MG Oral Cap Take 1 capsule (0.4 mg total) by mouth daily for 7  days.  Qty: 7 capsule, Refills: 0                 Supplementary Documentation:

## 2024-11-28 NOTE — ED INITIAL ASSESSMENT (HPI)
Pt ambulatory through triage c/o pain w/ urination and frequency with inability to empty bladder. Denies fevers. Symptoms started this am.

## 2024-11-28 NOTE — DISCHARGE INSTRUCTIONS
You have a kidney stone  This should pass on its own within the next few days  Push lots of fluids  Take the Flomax nightly and ketorolac every 8 hours as needed for pain  Return if develop fevers, severe pain, or persistent vomiting

## 2025-02-21 DIAGNOSIS — E11.65 TYPE 2 DIABETES MELLITUS WITH HYPERGLYCEMIA, WITHOUT LONG-TERM CURRENT USE OF INSULIN (HCC): ICD-10-CM

## 2025-02-21 NOTE — TELEPHONE ENCOUNTER
Endocrine Refill protocol for oral and injectable diabetic medications    Protocol Criteria:  PASSED  Reason: N/A    If all below requirements are met, send a 90-day supply with 1 refill per provider protocol.    Verify appointment with Endocrinology completed in the last 6 months or scheduled in the next 3 months.  Verify A1C has been completed within the last 6 months and is below 8.5%     Last completed office visit: 1/30/24  Next scheduled Follow up: 5/28/25  Last A1c result: Last A1c value was 6% done 1/30/2024.

## 2025-02-26 RX ORDER — TIRZEPATIDE 15 MG/.5ML
15 INJECTION, SOLUTION SUBCUTANEOUS WEEKLY
Qty: 6 ML | Refills: 0 | Status: SHIPPED | OUTPATIENT
Start: 2025-02-26 | End: 2025-02-27

## 2025-02-27 DIAGNOSIS — E11.65 TYPE 2 DIABETES MELLITUS WITH HYPERGLYCEMIA, WITHOUT LONG-TERM CURRENT USE OF INSULIN (HCC): ICD-10-CM

## 2025-02-27 RX ORDER — TIRZEPATIDE 15 MG/.5ML
15 INJECTION, SOLUTION SUBCUTANEOUS WEEKLY
Qty: 6 ML | Refills: 0 | Status: SHIPPED | OUTPATIENT
Start: 2025-02-27

## 2025-03-12 ENCOUNTER — APPOINTMENT (OUTPATIENT)
Dept: CT IMAGING | Facility: HOSPITAL | Age: 50
End: 2025-03-12
Attending: EMERGENCY MEDICINE
Payer: COMMERCIAL

## 2025-03-12 ENCOUNTER — HOSPITAL ENCOUNTER (EMERGENCY)
Facility: HOSPITAL | Age: 50
Discharge: HOME | End: 2025-03-12
Attending: EMERGENCY MEDICINE
Payer: COMMERCIAL

## 2025-03-12 VITALS
OXYGEN SATURATION: 95 % | HEART RATE: 67 BPM | DIASTOLIC BLOOD PRESSURE: 72 MMHG | SYSTOLIC BLOOD PRESSURE: 127 MMHG | RESPIRATION RATE: 19 BRPM | TEMPERATURE: 98 F

## 2025-03-12 DIAGNOSIS — N20.0 RIGHT KIDNEY STONE: Primary | ICD-10-CM

## 2025-03-12 DIAGNOSIS — I10 ELEVATED BLOOD PRESSURE READING WITH DIAGNOSIS OF HYPERTENSION: ICD-10-CM

## 2025-03-12 LAB
ALBUMIN SERPL-MCNC: 4.1 G/DL (ref 3.2–4.8)
ALP LIVER SERPL-CCNC: 62 U/L
ALT SERPL-CCNC: 24 U/L
ANION GAP SERPL CALC-SCNC: 6 MMOL/L (ref 0–18)
AST SERPL-CCNC: 24 U/L (ref ?–34)
B-HCG UR QL: NEGATIVE
BASOPHILS # BLD AUTO: 0.03 X10(3) UL (ref 0–0.2)
BASOPHILS NFR BLD AUTO: 0.4 %
BILIRUB DIRECT SERPL-MCNC: <0.1 MG/DL (ref ?–0.3)
BILIRUB SERPL-MCNC: 0.2 MG/DL (ref 0.3–1.2)
BILIRUB UR QL: NEGATIVE
BUN BLD-MCNC: 11 MG/DL (ref 9–23)
BUN/CREAT SERPL: 12.4 (ref 10–20)
CALCIUM BLD-MCNC: 8.6 MG/DL (ref 8.7–10.4)
CHLORIDE SERPL-SCNC: 108 MMOL/L (ref 98–112)
CLARITY UR: CLEAR
CO2 SERPL-SCNC: 26 MMOL/L (ref 21–32)
COLOR UR: COLORLESS
CREAT BLD-MCNC: 0.89 MG/DL
DEPRECATED RDW RBC AUTO: 42.7 FL (ref 35.1–46.3)
EGFRCR SERPLBLD CKD-EPI 2021: 79 ML/MIN/1.73M2 (ref 60–?)
EOSINOPHIL # BLD AUTO: 0.13 X10(3) UL (ref 0–0.7)
EOSINOPHIL NFR BLD AUTO: 1.8 %
ERYTHROCYTE [DISTWIDTH] IN BLOOD BY AUTOMATED COUNT: 13.2 % (ref 11–15)
GLUCOSE BLD-MCNC: 117 MG/DL (ref 70–99)
GLUCOSE UR-MCNC: NORMAL MG/DL
HCT VFR BLD AUTO: 41.1 %
HGB BLD-MCNC: 13.7 G/DL
IMM GRANULOCYTES # BLD AUTO: 0.02 X10(3) UL (ref 0–1)
IMM GRANULOCYTES NFR BLD: 0.3 %
KETONES UR-MCNC: NEGATIVE MG/DL
LEUKOCYTE ESTERASE UR QL STRIP.AUTO: NEGATIVE
LIPASE SERPL-CCNC: 79 U/L (ref 12–53)
LYMPHOCYTES # BLD AUTO: 3.82 X10(3) UL (ref 1–4)
LYMPHOCYTES NFR BLD AUTO: 52.3 %
MCH RBC QN AUTO: 29.4 PG (ref 26–34)
MCHC RBC AUTO-ENTMCNC: 33.3 G/DL (ref 31–37)
MCV RBC AUTO: 88.2 FL
MONOCYTES # BLD AUTO: 0.49 X10(3) UL (ref 0.1–1)
MONOCYTES NFR BLD AUTO: 6.7 %
NEUTROPHILS # BLD AUTO: 2.81 X10 (3) UL (ref 1.5–7.7)
NEUTROPHILS # BLD AUTO: 2.81 X10(3) UL (ref 1.5–7.7)
NEUTROPHILS NFR BLD AUTO: 38.5 %
NITRITE UR QL STRIP.AUTO: NEGATIVE
OSMOLALITY SERPL CALC.SUM OF ELEC: 290 MOSM/KG (ref 275–295)
PH UR: 6.5 [PH] (ref 5–8)
PLATELET # BLD AUTO: 240 10(3)UL (ref 150–450)
POTASSIUM SERPL-SCNC: 3.6 MMOL/L (ref 3.5–5.1)
PROT SERPL-MCNC: 7.3 G/DL (ref 5.7–8.2)
PROT UR-MCNC: NEGATIVE MG/DL
RBC # BLD AUTO: 4.66 X10(6)UL
RBC #/AREA URNS AUTO: >10 /HPF
SODIUM SERPL-SCNC: 140 MMOL/L (ref 136–145)
SP GR UR STRIP: 1.02 (ref 1–1.03)
UROBILINOGEN UR STRIP-ACNC: NORMAL
WBC # BLD AUTO: 7.3 X10(3) UL (ref 4–11)

## 2025-03-12 PROCEDURE — 80076 HEPATIC FUNCTION PANEL: CPT | Performed by: EMERGENCY MEDICINE

## 2025-03-12 PROCEDURE — 85025 COMPLETE CBC W/AUTO DIFF WBC: CPT

## 2025-03-12 PROCEDURE — 81001 URINALYSIS AUTO W/SCOPE: CPT | Performed by: EMERGENCY MEDICINE

## 2025-03-12 PROCEDURE — 74176 CT ABD & PELVIS W/O CONTRAST: CPT | Performed by: EMERGENCY MEDICINE

## 2025-03-12 PROCEDURE — 85025 COMPLETE CBC W/AUTO DIFF WBC: CPT | Performed by: EMERGENCY MEDICINE

## 2025-03-12 PROCEDURE — 96375 TX/PRO/DX INJ NEW DRUG ADDON: CPT

## 2025-03-12 PROCEDURE — 80048 BASIC METABOLIC PNL TOTAL CA: CPT | Performed by: EMERGENCY MEDICINE

## 2025-03-12 PROCEDURE — 81025 URINE PREGNANCY TEST: CPT

## 2025-03-12 PROCEDURE — 96361 HYDRATE IV INFUSION ADD-ON: CPT

## 2025-03-12 PROCEDURE — 80076 HEPATIC FUNCTION PANEL: CPT

## 2025-03-12 PROCEDURE — 99285 EMERGENCY DEPT VISIT HI MDM: CPT

## 2025-03-12 PROCEDURE — 83690 ASSAY OF LIPASE: CPT | Performed by: EMERGENCY MEDICINE

## 2025-03-12 PROCEDURE — 96374 THER/PROPH/DIAG INJ IV PUSH: CPT

## 2025-03-12 PROCEDURE — 99284 EMERGENCY DEPT VISIT MOD MDM: CPT

## 2025-03-12 RX ORDER — KETOROLAC TROMETHAMINE 30 MG/ML
30 INJECTION, SOLUTION INTRAMUSCULAR; INTRAVENOUS ONCE
Status: COMPLETED | OUTPATIENT
Start: 2025-03-12 | End: 2025-03-12

## 2025-03-12 RX ORDER — MORPHINE SULFATE 4 MG/ML
4 INJECTION, SOLUTION INTRAMUSCULAR; INTRAVENOUS ONCE
Status: COMPLETED | OUTPATIENT
Start: 2025-03-12 | End: 2025-03-12

## 2025-03-12 RX ORDER — ONDANSETRON 2 MG/ML
INJECTION INTRAMUSCULAR; INTRAVENOUS
Status: DISCONTINUED
Start: 2025-03-12 | End: 2025-03-12

## 2025-03-12 RX ORDER — HYDROCODONE BITARTRATE AND ACETAMINOPHEN 5; 325 MG/1; MG/1
1 TABLET ORAL EVERY 6 HOURS PRN
Qty: 12 TABLET | Refills: 0 | Status: SHIPPED | OUTPATIENT
Start: 2025-03-12 | End: 2025-03-17

## 2025-03-12 RX ORDER — ONDANSETRON 2 MG/ML
4 INJECTION INTRAMUSCULAR; INTRAVENOUS ONCE
Status: COMPLETED | OUTPATIENT
Start: 2025-03-12 | End: 2025-03-12

## 2025-03-12 RX ORDER — IBUPROFEN 600 MG/1
600 TABLET, FILM COATED ORAL EVERY 6 HOURS PRN
Qty: 28 TABLET | Refills: 0 | Status: SHIPPED | OUTPATIENT
Start: 2025-03-12 | End: 2025-03-19

## 2025-03-12 RX ORDER — ONDANSETRON 4 MG/1
4 TABLET, ORALLY DISINTEGRATING ORAL
Qty: 10 TABLET | Refills: 0 | Status: SHIPPED | OUTPATIENT
Start: 2025-03-12 | End: 2025-03-19

## 2025-03-12 RX ORDER — TAMSULOSIN HYDROCHLORIDE 0.4 MG/1
0.4 CAPSULE ORAL DAILY
Qty: 7 CAPSULE | Refills: 0 | Status: SHIPPED | OUTPATIENT
Start: 2025-03-12 | End: 2025-03-19

## 2025-03-12 NOTE — ED INITIAL ASSESSMENT (HPI)
Pt to ED for c/o right side \"cramps\" that started this morning at 0200. Reports vomiting once, denies nausea

## 2025-03-12 NOTE — DISCHARGE INSTRUCTIONS
Thank you for seeking care at [facility] Emergency Department.  You have been seen, evaluated, and diagnosed with kidney stones.    We reviewed the results from your visit in the emergency department.    Please read the instructions provided   If provided, take prescriptions as instructed.     Please follow up with the urologist in 3-5 days. Call today or tomorrow to schedule an appointment. Ensure that you stay well hydrated while you attempt to pass this stone. You should strain your urine with a urine strainer to monitor for passage of the kidney stone.    Remember, your care process does not end after your visit today. Please follow-up with your doctor within 1-2 days for a follow-up check to ensure you are  improving, to see if you need any further evaluation/testing, or to evaluate for any alternate diagnoses.     Please return to the emergency department right away if your symptoms are persisting for more than 48 hours, getting worse, or you begin having fevers or chills, or if you develop any other new or concerning symptoms as these could be signs of more serious medical illness. Fevers with a kidney stone always requires prompt reevaluation.    We hope you feel better.

## 2025-03-12 NOTE — ED PROVIDER NOTES
Patient Seen in: [facility] Emergency Department    History     Chief Complaint   Patient presents with    Abdomen/Flank Pain     Stated Complaint: flank pain    Patient is a 49F hx of DM, HTN, HLD, obesity, kidney stones, presents with severe R low back pain radiating to R lower abdomen that woke her from sleep at 0200. Reports nausea and episodes of NBNB emesis. No diarrhea or bloody stool. No dysuria or hematuria, no frequency, no fever.  Sts feels like when she's had kidney stones before.       Past Medical History:    Diabetes (HCC)    Disorder of thyroid    High blood pressure    High cholesterol    Morbid obesity with BMI of 40.0-44.9, adult (HCC)    Obesity    Sleep apnea    mouthguard    Visual impairment    glasses       Past Surgical History:   Procedure Laterality Date    Cholecystectomy              Family History   Problem Relation Age of Onset    Diabetes Mother     Diabetes Paternal Grandmother     Cancer Paternal Grandmother         ovarian    Cancer Paternal Grandfather         prostate        Social History     Socioeconomic History    Marital status:    Tobacco Use    Smoking status: Never    Smokeless tobacco: Never   Vaping Use    Vaping status: Never Used   Substance and Sexual Activity    Alcohol use: Yes    Drug use: No     Social Drivers of Health     Food Insecurity: No Food Insecurity (3/22/2022)    [facility]    Hunger Vital Sign     Worried About Running Out of Food in the Last Year: Never true     Ran Out of Food in the Last Year: Never true   Transportation Needs: No Transportation Needs (3/22/2022)    [facility]    PRAPARE - Transportation     Lack of Transportation (Medical): No     Lack of Transportation (Non-Medical): No       Review of Systems    Positive for stated complaint: flank pain  Other systems are as noted in  HPI.  Constitutional and vital signs reviewed.      All other systems reviewed and negative except as noted above.    PSFH elements reviewed from today and agreed except as otherwise stated in HPI.    Physical Exam     ED Triage Vitals   BP 03/12/25 0318 (!) 165/85   Pulse 03/12/25 0316 75   Resp 03/12/25 0316 19   Temp 03/12/25 0316 97.9 °F (36.6 °C)   Temp src 03/12/25 0316 Oral   SpO2 03/12/25 0316 100 %   O2 Device 03/12/25 0316 None (Room air)       Current:/72   Pulse 67   Temp 97.9 °F (36.6 °C) (Oral)   Resp 19   LMP 11/06/2024 (Exact Date)   SpO2 95%           General Appearance: appears uncomfortable, in pain,  Eyes: pupils equal and round no pallor or injection  ENT, Mouth: mucous membranes moist  Respiratory: there are no retractions, lungs are clear to auscultation no wheezes   Cardiovascular: regular rate and rhythm  no mrg   Gastrointestinal:  abdomen is soft with mild RLQ ttp, no masses, bowel sounds normal, no flank tenderness  Neurological: II-XII grossly intact  no focal deficits  Skin: warm and dry, no rashes.  Musculoskeletal:  Extremities are symmetric, full range of motion  Psychiatric: patient is pleasant, there is no agitation        ED Course     Labs Reviewed   HEPATIC FUNCTION PANEL (7) - Abnormal; Notable for the following components:       Result Value    Bilirubin, Total 0.2 (*)     All other components within normal limits   URINALYSIS WITH CULTURE REFLEX - Abnormal; Notable for the following components:    Urine Color Colorless (*)     Blood Urine 1+ (*)     RBC Urine >10 (*)     Squamous Epi. Cells Few (*)     All other components within normal limits   BASIC METABOLIC PANEL (8) - Abnormal; Notable for the following components:    Glucose 117 (*)     Calcium, Total 8.6 (*)     All other components within normal limits   LIPASE - Abnormal; Notable for the following components:    Lipase 79 (*)     All other components within normal limits   POCT PREGNANCY URINE - Normal    CBC WITH DIFFERENTIAL WITH PLATELET       MDM     Patient is a 49F hx of DM, HTN, HLD, obesity, kidney stones, presents with severe R low back pain radiating to R lower abdomen that woke her from sleep at 0200.     Ddx includes  Kidney stone  Uti or pyelonephritis  Ovarian torsion  Appendicitis   Perforated viscus     Plan: cbc, cmp, UA/Ucx, urine pregnancy, CTAP wo contrast. Will give toradol, morphine, zofran, IV NS bolus     Dispo pending ER workup     Radiology Interpretation:  No results found.    ED Course as of 03/12/25 0540  ------------------------------------------------------------  Time: 03/12 0357  Comment: Cbc unremarkable   ------------------------------------------------------------  Time: 03/12 0424  Comment: Lipase slightly elevated. Cmp unremarkable   ------------------------------------------------------------  Time: 03/12 0454  Comment: Upreg neg. Ua with small hematuria   ------------------------------------------------------------  Time: 03/12 0532  Comment: CT abdomen and pelvis without contrast      IMPRESSION:    Comparison: 11/28/2024    Mild hydroureteronephrosis on the right with perinephric and periureteral fat stranding, secondary to a 2 mm calculus in the distal right ureter.  No hydroureteronephrosis on the left.  Tiny nonobstructive calculus near the superior pole of the left kidney.  Mildly thickened urinary bladder with subtle surrounding fat stranding possibly indicating cystitis.    Cholecystectomy.  Tubal ligation coils.      ------------------------------------------------------------  Time: 03/12 4690  Comment: Patient reevaluated, feeling significantly better. BP improved. Pt updated with findings of 2 mm kidney stone that will likely pass on its own giving the size.  Rest of labs today are unremarkable.  Advise follow-up with urology in 1 week, Flomax, and pain medicines as needed which were sent to pharmacy. Return if new/worsening sx right away or difficulty with  following up. Recheck BP as outpatient with PMD within 3 months. Pt verbalized understanding comfortable with DC plan. Will give urine strainer as well         Disposition and Plan     Clinical Impression:  1. Right kidney stone    2. Elevated blood pressure reading with diagnosis of hypertension        Disposition:  Discharge    Follow-up:  Gatito Tyler MD  [address]  [place] [id number] [phone]    Schedule an appointment as soon as possible for a visit in 1 week(s)      [facility] Emergency Department  [address]  [facility] [id number] [phone]  Go to  If symptoms worsen, right away    Dayton Perez MD  [address]  [facility] [address] [phone]    Schedule an appointment as soon as possible for a visit in 2 day(s)        Medications Prescribed:  Current Discharge Medication List        START taking these medications    Details   tamsulosin (FLOMAX) 0.4 MG Oral Cap Take 1 capsule (0.4 mg total) by mouth daily for 7 days.  Qty: 7 capsule, Refills: 0    Associated Diagnoses: Right kidney stone      ibuprofen 600 MG Oral Tab Take 1 tablet (600 mg total) by mouth every 6 (six) hours as needed.  Qty: 28 tablet, Refills: 0    Associated Diagnoses: Right kidney stone      HYDROcodone-acetaminophen 5-325 MG Oral Tab Take 1 tablet by mouth every 6 (six) hours as needed for Pain.  Qty: 12 tablet, Refills: 0    Associated Diagnoses: Right kidney stone      ondansetron 4 MG Oral Tablet Dispersible Take 1 tablet (4 mg total) by mouth every 4 to 6 hours as needed for Nausea.  Qty: 10 tablet, Refills: 0    Associated Diagnoses: Right kidney stone             Lizabeth Kerr DO  Attending Physician  Emergency Medicine

## 2025-03-31 DIAGNOSIS — E11.65 UNCONTROLLED TYPE 2 DIABETES MELLITUS WITH HYPERGLYCEMIA (HCC): ICD-10-CM

## 2025-04-01 RX ORDER — EMPAGLIFLOZIN 25 MG/1
25 TABLET, FILM COATED ORAL DAILY
Qty: 90 TABLET | Refills: 0 | Status: SHIPPED | OUTPATIENT
Start: 2025-04-01

## 2025-04-01 RX ORDER — GLIPIZIDE 5 MG/1
5 TABLET ORAL
Qty: 90 TABLET | Refills: 0 | Status: SHIPPED | OUTPATIENT
Start: 2025-04-01

## 2025-04-01 NOTE — TELEPHONE ENCOUNTER
Endocrine Refill protocol for metformin    Protocol Criteria:  PASSED  Reason: N/A    If all below requirements are met, send a 90-day supply with 1 refill per provider protocol.     Verify appointment with Endocrinology completed in the last 6 months or scheduled in the next 3 months.  Verify A1C has been completed within the last 6 months and is below 8.5%  Verify last GFR is greater than or equal to 40 in the past 12 months    Last completed office visit:Visit date not found   Next scheduled Follow up:   Future Appointments   Date Time Provider Department Center   5/28/2025 11:40 AM [facility]       Last GFR result:    Lab Results   Component Value Date    EGFRCR 79 03/12/2025     Last A1c result: Last A1C result: 6% done 1/30/2024.

## 2025-05-18 DIAGNOSIS — E11.65 TYPE 2 DIABETES MELLITUS WITH HYPERGLYCEMIA, WITHOUT LONG-TERM CURRENT USE OF INSULIN (HCC): ICD-10-CM

## 2025-05-19 RX ORDER — TIRZEPATIDE 15 MG/.5ML
15 INJECTION, SOLUTION SUBCUTANEOUS WEEKLY
Qty: 6 ML | Refills: 0 | Status: SHIPPED | OUTPATIENT
Start: 2025-05-19

## 2025-05-19 NOTE — TELEPHONE ENCOUNTER
Endocrine Refill protocol for oral and injectable diabetic medications    Protocol Criteria:  FAILED  Reason: No Visit in required time frame and No labs completed in required time frame - A1c    If all below requirements are met, send a 90-day supply with 1 refill per provider protocol.    Verify appointment with Endocrinology completed in the last 6 months or scheduled in the next 3 months.  Verify A1C has been completed within the last 6 months and is below 8.5%     Last completed office visit: Visit date not found     Next scheduled Follow up:   Future Appointments   Date Time Provider Department Center   5/28/2025 11:40 AM Antonia Hicks MD [facility]      Last A1c result:  Component  Ref Range & Units 10/23/24  9:33 AM   External HGBA1C  4.0 - 6.0 % 6.1 High

## 2025-06-14 ENCOUNTER — TELEPHONE (OUTPATIENT)
Dept: ENDOCRINOLOGY CLINIC | Facility: CLINIC | Age: 50
End: 2025-06-14

## 2025-07-10 DIAGNOSIS — E11.65 UNCONTROLLED TYPE 2 DIABETES MELLITUS WITH HYPERGLYCEMIA (HCC): ICD-10-CM

## 2025-07-10 NOTE — TELEPHONE ENCOUNTER
Endocrine Refill protocol for oral and injectable diabetic medications    Protocol Criteria:  FAILED  Reason: Abnormal labs    If all below requirements are met, send a 90-day supply with 1 refill per provider protocol.    Verify appointment with Endocrinology completed in the last 6 months or scheduled in the next 3 months.  Verify A1C has been completed within the last 6 months and is below 8.5%     Last completed office visit: Visit date not found   Next scheduled Follow up:   Future Appointments   Date Time Provider Department Center   9/3/2025 11:00 AM Antonia Hicks MD [facility]      Last A1c result: Last A1c value was 6% done 1/30/2024.

## 2025-07-11 RX ORDER — EMPAGLIFLOZIN 25 MG/1
25 TABLET, FILM COATED ORAL DAILY
Qty: 90 TABLET | Refills: 1 | Status: SHIPPED | OUTPATIENT
Start: 2025-07-11

## 2025-08-27 DIAGNOSIS — E11.65 TYPE 2 DIABETES MELLITUS WITH HYPERGLYCEMIA, WITHOUT LONG-TERM CURRENT USE OF INSULIN (HCC): ICD-10-CM

## 2025-08-29 RX ORDER — TIRZEPATIDE 15 MG/.5ML
15 INJECTION, SOLUTION SUBCUTANEOUS WEEKLY
Qty: 6 ML | Refills: 0 | Status: SHIPPED | OUTPATIENT
Start: 2025-08-29

## (undated) DEVICE — LINE MNTR ADLT SET O2 INTMD

## (undated) DEVICE — CONMED SCOPE SAVER BITE BLOCK, 20X27 MM: Brand: SCOPE SAVER

## (undated) DEVICE — MEDI-VAC NON-CONDUCTIVE SUCTION TUBING 6MM X 1.8M (6FT.) L: Brand: CARDINAL HEALTH

## (undated) DEVICE — Device: Brand: CUSTOM PROCEDURE KIT

## (undated) DEVICE — Device: Brand: DEFENDO AIR/WATER/SUCTION AND BIOPSY VALVE

## (undated) DEVICE — 35 ML SYRINGE REGULAR TIP: Brand: MONOJECT

## (undated) DEVICE — FORCEP RADIAL JAW 4

## (undated) DEVICE — BIOPSY FORCEP MULTIBITE

## (undated) NOTE — MR AVS SNAPSHOT
[facility]  [address] [address] [phone]               Thank you for choosing us for your health care visit with Sigifredo Masterson MD.  We are glad to serve you and happy to provide you with this summary of your visit. Support Staff. Remember, [employer] is NOT to be used for urgent needs. For medical emergencies, dial 911. Visit [email or web address] to learn more.         Educational Information     Healthy Diet and Regular Exercise  [employer]

## (undated) NOTE — LETTER
Date & Time: 2/23/2020, 12:34 AM  Patient: Urszula Ontiveros  Encounter Provider(s):    Dilshad Frausto MD       To Whom It May Concern:    Errol Beverly was seen and treated in our department on 2/22/2020. She should not return to work until Tuesday 2/25.     I

## (undated) NOTE — LETTER
[address]  [facility]       08/01/17        Patient: Michelle Burnett   YOB: 1975   Date of Visit: 8/1/2017       Dear  Dr. Abdirashid Berry,      Thank you for referring Michelle Burnett to my practice.   Please find my as

## (undated) NOTE — LETTER
7/13/2021              [address] [place] [address]        [place] [id number]       To Whom It May Concern,        Kenia Pacheco is under my care Diabetes Mellitus Type 2. She is required to have insulin and needles with her at all times.  P

## (undated) NOTE — ED AVS SNAPSHOT
Joel Pringle   MRN: [id number]    Department:  [facility] Emergency Department   Date of Visit:  2/5/2019           Disclosure     Insurance plans vary and the physician(s) referred by the ER may not be covered by your plan.  Please contact you CARE PHYSICIAN AT ONCE OR RETURN IMMEDIATELY TO THE EMERGENCY DEPARTMENT. If you have been prescribed any medication(s), please fill your prescription right away and begin taking the medication(s) as directed.   If you believe that any of the medications

## (undated) NOTE — ED AVS SNAPSHOT
[facility] Emergency Department    [address]     [address] [place] [address]    Phone:  [phone]    Fax:  [phone]   MRN: [id number]    Department:  [facility] Emergency Department   Date of Visit:  4/21/2 Bring a paper prescription for each of these medications    - Cyclobenzaprine HCl 10 MG Tabs  - HYDROcodone-acetaminophen 5-325 MG Tabs  - methylPREDNISolone 4 MG Tbpk            Discharge References/Attachments     BACK PAIN (LOW): SELF-CARE (ENGLISH) visiting [email or web address].    IF THERE IS ANY CHANGE OR WORSENING OF YOUR CONDITION, CALL YOUR PRIMARY CARE PHYSICIAN AT ONCE OR RETURN IMMEDIATELY TO THE EMERGENCY DEPARTMENT.     If you have been prescribed any medication(s), please fill your prescription - If you have concerns related to behavioral health issues or thoughts of harming yourself, contact [address] [place] [address] at [phone].     - If you don’t have insurance, Neel Mueller has partnered with [employer]

## (undated) NOTE — ED AVS SNAPSHOT
[facility] Emergency Department    [place] [address]    Phone:  [phone]    Fax:  [phone]   MRN: [id number]    Department:  [facility] Emergency Department   Date of Visit:  4/21/2 and Class Registration line at [phone] or find a doctor online by visiting [email or web address].    IF THERE IS ANY CHANGE OR WORSENING OF YOUR CONDITION, CALL YOUR PRIMARY CARE PHYSICIAN AT ONCE OR RETURN IMMEDIATELY TO [address] [place] [address].     If

## (undated) NOTE — ED AVS SNAPSHOT
Binta Rosen   MRN: [id number]    Department:  [facility] Emergency Department   Date of Visit:  10/8/2018           Disclosure     Insurance plans vary and the physician(s) referred by the ER may not be covered by your plan.  Please contact yo CARE PHYSICIAN AT ONCE OR RETURN IMMEDIATELY TO THE EMERGENCY DEPARTMENT. If you have been prescribed any medication(s), please fill your prescription right away and begin taking the medication(s) as directed.   If you believe that any of the medications

## (undated) NOTE — ED AVS SNAPSHOT
Shadia Russell   MRN: [id number]    Department:  [facility] Emergency Department   Date of Visit:  2/22/2020           Disclosure     Insurance plans vary and the physician(s) referred by the ER may not be covered by your plan.  Please contact yo CARE PHYSICIAN AT ONCE OR RETURN IMMEDIATELY TO THE EMERGENCY DEPARTMENT. If you have been prescribed any medication(s), please fill your prescription right away and begin taking the medication(s) as directed.   If you believe that any of the medications

## (undated) NOTE — LETTER
Date & Time: 3/12/2025, 5:40 AM  Patient: Giselle Rodriguez  Encounter Provider(s):    Lizabeth Kerr DO       To Whom It May Concern:    Giselle Rodriguez was seen and treated in our department on 3/12/2025. She should not return to work until 3/14/2025 .    If you have any questions or concerns, please do not hesitate to call.        _____________________________  Physician/APC Signature

## (undated) NOTE — ED AVS SNAPSHOT
Urszula Ontiveros   MRN: [id number]    Department:  [facility] Emergency Department   Date of Visit:  11/25/2017           Disclosure     Insurance plans vary and the physician(s) referred by the ER may not be covered by your plan.  Please contact y CARE PHYSICIAN AT ONCE OR RETURN IMMEDIATELY TO THE EMERGENCY DEPARTMENT. If you have been prescribed any medication(s), please fill your prescription right away and begin taking the medication(s) as directed.   If you believe that any of the medications